# Patient Record
Sex: FEMALE | Race: WHITE | NOT HISPANIC OR LATINO | Employment: OTHER | URBAN - METROPOLITAN AREA
[De-identification: names, ages, dates, MRNs, and addresses within clinical notes are randomized per-mention and may not be internally consistent; named-entity substitution may affect disease eponyms.]

---

## 2017-05-01 ENCOUNTER — OFFICE VISIT (OUTPATIENT)
Dept: NEUROLOGY | Facility: CLINIC | Age: 82
End: 2017-05-01

## 2017-05-01 VITALS — WEIGHT: 179 LBS | BODY MASS INDEX: 31.71 KG/M2 | SYSTOLIC BLOOD PRESSURE: 130 MMHG | DIASTOLIC BLOOD PRESSURE: 70 MMHG

## 2017-05-01 DIAGNOSIS — F03.90 DEMENTIA WITHOUT BEHAVIORAL DISTURBANCE, UNSPECIFIED DEMENTIA TYPE: ICD-10-CM

## 2017-05-01 DIAGNOSIS — R26.89 IMPAIRMENT OF BALANCE: Primary | ICD-10-CM

## 2017-05-01 PROCEDURE — 99214 OFFICE O/P EST MOD 30 MIN: CPT | Performed by: PHYSICIAN ASSISTANT

## 2017-05-02 ENCOUNTER — TELEPHONE (OUTPATIENT)
Dept: NEUROLOGY | Facility: CLINIC | Age: 82
End: 2017-05-02

## 2017-07-17 RX ORDER — DONEPEZIL HYDROCHLORIDE 10 MG/1
TABLET, FILM COATED ORAL
Qty: 30 TABLET | Refills: 11 | Status: SHIPPED | OUTPATIENT
Start: 2017-07-17 | End: 2018-07-15 | Stop reason: SDUPTHER

## 2017-11-06 ENCOUNTER — OFFICE VISIT (OUTPATIENT)
Dept: NEUROLOGY | Facility: CLINIC | Age: 82
End: 2017-11-06

## 2017-11-06 VITALS
BODY MASS INDEX: 31.18 KG/M2 | SYSTOLIC BLOOD PRESSURE: 116 MMHG | DIASTOLIC BLOOD PRESSURE: 78 MMHG | HEIGHT: 63 IN | WEIGHT: 176 LBS

## 2017-11-06 DIAGNOSIS — R41.89 COGNITIVE IMPAIRMENT: Primary | ICD-10-CM

## 2017-11-06 PROCEDURE — 99214 OFFICE O/P EST MOD 30 MIN: CPT | Performed by: PSYCHIATRY & NEUROLOGY

## 2017-11-06 RX ORDER — ESCITALOPRAM OXALATE 20 MG/1
20 TABLET ORAL DAILY
COMMUNITY
End: 2018-07-25

## 2017-11-06 RX ORDER — MECLIZINE HYDROCHLORIDE 25 MG/1
25 TABLET ORAL 3 TIMES DAILY PRN
COMMUNITY
End: 2020-10-07

## 2017-11-06 RX ORDER — PHENOL 1.4 %
600 AEROSOL, SPRAY (ML) MUCOUS MEMBRANE DAILY
COMMUNITY
End: 2018-07-25

## 2017-11-06 RX ORDER — ARIPIPRAZOLE 2 MG/1
2 TABLET ORAL DAILY
COMMUNITY
End: 2018-10-09 | Stop reason: CLARIF

## 2017-11-06 NOTE — PROGRESS NOTES
"Subjective      CC: memory loss    History of Present Illness   Daly Gauthier is a 82 y.o. female who returns to clinic today with a difficult history of cognitive impairment. She and her family have noted a progressive cognitive decline since 2011 when she underwent chemotherapy and radiation for breast cancer. This has been marked over time by impairments in memory, orientation, language and executive function.    An MRI and screening bloodwork have been unrevealing. She has been treated with donepezil, and has been intolerant of Namenda due to dizziness. She saw Dr. Urrutia at  in March, 2017 and was diagnosed with vascular dementia by report.     Since her last visit on 5/1/17, her family notes continued cognitive impairment and apathy. Her family has also noted \"blackout episodes\" during which she can act normally but is subsequently amnestic. She continues to follow with behavioral health for depression.      The following portions of the patient's history were reviewed and updated as appropriate: allergies, current medications, past family history, past medical history, past social history, past surgical history and problem list.    Review of Systems   Constitutional: Negative.        Objective     /78  Ht 63\" (160 cm)  Wt 176 lb (79.8 kg)  BMI 31.18 kg/m2    General appearance today is normal.       Physical Exam   Neurological: She has a normal Finger-Nose-Finger Test.   Psychiatric: Her speech is normal.        Neurologic Exam     Mental Status   Oriented to person.   Disoriented to place.   Disoriented to time.   Registration: recalls 3 of 3 objects. Recall at 5 minutes: recalls 3 of 3 objects. Follows 3 step commands.   Attention: normal.   Speech: speech is normal   Level of consciousness: alert  Able to name object. Able to read. Able to repeat. Able to write. Normal comprehension.     Cranial Nerves   Cranial nerves II through XII intact.     Gait, Coordination, and Reflexes     Gait  Gait: " (somewhat unsteady)    Coordination   Finger to nose coordination: normal    Tremor   Resting tremor: absent        Results  MMSE=23      Assessment/Plan   Daly was seen today for memory loss.    Diagnoses and all orders for this visit:    Cognitive impairment  -     EEG Awake or Asleep Routine          Discussion/Summary   Daly Gauthier returns to clinic today again with a difficult history. A vascular dementia as proposed by Dr. Urrutia at  is possible, I also worry about the contribution of depression, which was discussed. I again reviewed her current status and treatment options. It was elected to again attempt to obtain her records from her recent visit at . After discussing potential treatment options, it was elected to continue on donepezil unchanged. She will continue to follow with behavioral health. I again recommended increased physical activity. We will obtain an EEG to better exclude seizure. For chronic hypoxia her family will check with her PCP. She will then follow up in 6 months, or sooner if needed.       I spent 20 minutes out of 30  minutes face to face with the patient and family and discussing diagnosis, diagnostic testing, evaluation, current status, treatment options and management.    As part of this visit I obtained additional history from the family which is incorporated in the HPI.      Jun Kennedy MD

## 2017-11-15 ENCOUNTER — HOSPITAL ENCOUNTER (OUTPATIENT)
Dept: NEUROLOGY | Facility: HOSPITAL | Age: 82
Discharge: HOME | End: 2017-11-15
Attending: PSYCHIATRY & NEUROLOGY | Admitting: PSYCHIATRY & NEUROLOGY

## 2017-11-15 PROCEDURE — 95816 EEG AWAKE AND DROWSY: CPT

## 2018-05-07 ENCOUNTER — OFFICE VISIT (OUTPATIENT)
Dept: NEUROLOGY | Facility: CLINIC | Age: 83
End: 2018-05-07

## 2018-05-07 VITALS
DIASTOLIC BLOOD PRESSURE: 60 MMHG | SYSTOLIC BLOOD PRESSURE: 124 MMHG | WEIGHT: 170 LBS | HEIGHT: 63 IN | BODY MASS INDEX: 30.12 KG/M2

## 2018-05-07 DIAGNOSIS — R41.89 COGNITIVE IMPAIRMENT: Primary | ICD-10-CM

## 2018-05-07 PROCEDURE — 99214 OFFICE O/P EST MOD 30 MIN: CPT | Performed by: PHYSICIAN ASSISTANT

## 2018-07-06 ENCOUNTER — HOSPITAL ENCOUNTER (OUTPATIENT)
Dept: SPEECH THERAPY | Facility: HOSPITAL | Age: 83
Setting detail: THERAPIES SERIES
Discharge: HOME | End: 2018-07-06

## 2018-07-06 DIAGNOSIS — R41.89 COGNITIVE IMPAIRMENT: ICD-10-CM

## 2018-07-06 DIAGNOSIS — G31.84 MILD COGNITIVE IMPAIRMENT: Primary | ICD-10-CM

## 2018-07-06 PROCEDURE — 92523 SPEECH SOUND LANG COMPREHEN: CPT

## 2018-07-06 PROCEDURE — G9169 MEMORY GOAL STATUS: HCPCS

## 2018-07-06 PROCEDURE — G9168 MEMORY CURRENT STATUS: HCPCS

## 2018-07-06 NOTE — THERAPY EVALUATION
Outpatient Speech Language Pathology   Adult Speech Language Cognitive Initial Evaluation  [facility]     Patient Name: Daly Gauthier  : 1935  MRN: [id number]  Today's Date: 2018        Visit Date: 2018   Patient Active Problem List   Diagnosis   • Mild cognitive impairment   • History of right breast cancer   • Cognitive impairment        Past Medical History:   Diagnosis Date   • Anxiety    • Depression    • Diabetes mellitus (CMS/HCC)    • Hyperlipidemia    • Hypertension         No past surgical history on file.      Visit Dx:    ICD-10-CM ICD-9-CM   1. Mild cognitive impairment G31.84 331.83   2. Cognitive impairment R41.89 294.9                 SLP SLC Evaluation - 18 1500        Communication Assessment/Intervention    Document Type evaluation  -HG    Subjective Information no complaints  -HG    Patient Observations alert;cooperative;agree to therapy  -HG    Patient/Family Observations  present for evaluation and is supportive.  -HG    Patient Effort good  -HG    Symptoms Noted During/After Treatment none  -HG       General Information    Patient Profile Reviewed yes  -HG    Pertinent History Of Current Problem Pt's family has noted a decrease in memory since . In 2017 pt was seen by Neurology and was given a vasular dementia dx.   -HG    Precautions/Limitations, Vision WFL with corrective lenses  -HG    Precautions/Limitations, Hearing WFL  -HG    Prior Level of Function-Communication WFL  -HG    Plans/Goals Discussed with patient;spouse/S.O.  -HG    Barriers to Rehab cognitive status  -HG    Patient's Goals for Discharge patient did not state  -HG    Family Goals for Discharge patient able to provide self-care with only supervision  -HG    Standardized Assessment Used SLUMS  -HG       Cognitive Assessment Intervention- SLP    Cognitive Function (Cognition) severe impairment  -HG    Orientation Status (Cognition) person;place  -HG    Memory (Cognitive) severe  impairment  -HG    Attention (Cognitive) moderate impairment;severe impairment  -HG    Thought Organization (Cognitive) moderate impairment;severe impairment  -HG    Reasoning (Cognitive) moderate impairment;severe impairment  -HG    Problem Solving (Cognitive) moderate impairment;severe impairment  -HG       Standardized Tests    Cognitive/Memory Tests SLUMS: [facility] Mental Status Examination  -HG       SLUMS: [facility] Mental Status Examination    SLUMS Score 13  -HG    SLUMS Range 1-20: Dementia (High school education or higher)  -HG       SLP Clinical Impressions    SLP Diagnosis Moderate to Severe cognitive impairment  -HG    Rehab Potential/Prognosis good  -    Criteria for Skilled Therapy Interventions Met yes  -HG    Functional Impact functional impact in social situations;functional impact in ADLs;unable to make medical decisions;unable to care for self;needs 24 hour supervision;difficulty communicating wants, needs;difficulty communicating in an emergency;difficulty in expressing complex messages;restrictions in personal and social life;decreased ability to respond to situations safely;Poor Judgement  -HG      User Key  (r) = Recorded By, (t) = Taken By, (c) = Cosigned By    Initials Name Provider Type     Diana Fish MS [facility]-SLP Speech and Language Pathologist                               OP SLP Education     Row Name 07/06/18 1500       Education    Barriers to Learning No barriers identified  -    Education Provided Described results of evaluation;Patient expressed understanding of evaluation;Family/caregivers expressed understanding of evaluation;Patient participated in establishing goals and treatment plan;Family/caregivers participated in establishing goals and treatment plan;Patient requires further education on strategies, risks;Patient demonstrated recommended strategies;Family/caregivers demonstrated recommended strategies;Family/caregivers require further  education on strategies, risks  -    Assessed Learning needs;Learning motivation;Learning preferences;Learning readiness  -    Learning Motivation Strong  -    Learning Method Explanation;Demonstration;Teach back;Written materials  -    Teaching Response Verbalized understanding;Demonstrated understanding;Reinforcement needed  -    Education Comments Pt given thought organization homework.   -HG      User Key  (r) = Recorded By, (t) = Taken By, (c) = Cosigned By    Initials Name Effective Dates    HG Diana DUMONT MS [facility]-SLP 06/22/15 -                 SLP OP Goals     Row Name 07/06/18 1500          Goal Type Needed    Goal Type Needed Memory;Other Adult Goals  -        Subjective Comments    Subjective Comments Pt alert, cooperative, accompanied with .  -HG        Memory Goals    Patient will be able to remember  information needed to participate in activities of daily living at least 2-3 a day with min verbal and visual cues.  -HG     Status: Patient will be able to remember  information needed to participate in activities of daily living New  -HG     Patient’s memory skills will be enhanced as reported by patient by using external memory aides 80%:;with cues  -HG     Status: Patient’s memory skills will be enhanced as reported by patient by using external memory aides New  -HG     Patient will demonstrate improved ability to recall information by listening to paragraph and answering yes/no questions 80%:;with cues  -HG     Status: Patient will demonstrate improved ability to recall information by listening to paragraph and answering yes/no questions New  -HG     Patient will demonstrate improved ability to recall information by stating activities patient has completed that day 80%:;with cues  -HG     Status: Patient will demonstrate improved ability to recall information by stating activities patient has completed that day New  -        Other Goals    Other Adult Goal- 1 Pt will complete  divergent and convergent naming with 80% accuracy with min cues.  -HG     Status: Other Adult Goal- 1 New  -HG     Other Adult Goal- 2 Pt will complete reasoning tasks with 80% accuracy.   -HG     Status: Other Adult Goal- 2 New  -HG     Other Adult Goal- 3 Pt will improve SLUMS score to at least a 20 falling in the MNCD range.   -HG        SLP Time Calculation    SLP Goal Re-Cert Due Date 08/05/18  -HG       User Key  (r) = Recorded By, (t) = Taken By, (c) = Cosigned By    Initials Name Provider Type     Diana Fish MS [facility]-SLP Speech and Language Pathologist                OP SLP Assessment/Plan - 07/06/18 1500        SLP Assessment    Functional Problems Speech Language- Adult/Cognition  -    Impact on Function: Adult Speech Language/Cognition Difficulty communicating wants, needs, and ideas;Difficulty communicating in a medical emergency;Restrictions in personal and social life;Difficulty sequencing thoughts to express complex messages;Unable to understand written/spoken language;Difficulty following directions;Difficulty sequencing or problem solving to complete ADLs;Unrealistic view of abilities/deficits;Lack of insight or awareness of deficits, safety issues;Decreased recall of personal information and medical history;Trouble learning or remembering new information;Poor attention to task;Requires supervision  -    Clinical Impression: Speech Language-Adult/Congnition Moderate-Severe:;Cognitive Communication Impairment  -HG    Functional Problems Comment Pt unable to complete any task without her  and has exhibited a physical decline as well with a referral to PT and OT.  -HG    Clinical Impression Comments SLUMS score of 13/30 places pt in the Dementia range.  -HG    Please refer to paper survey for additional self-reported information Yes  -HG    Please refer to items scanned into chart for additional diagnostic informaiton and handouts as provided by clinician Yes  -HG    SLP Diagnosis  Moderate to Severe Cognitive Impairment  -HG    Prognosis Good (comment)  -HG    Patient/caregiver participated in establishment of treatment plan and goals Yes  -HG    Patient would benefit from skilled therapy intervention Yes  -HG       SLP Plan    Frequency 1-2x/week  -HG    Duration 8 weeks  -HG    Planned CPT's? SLP SPEECH & LANGUAGE EVAL: 43649;SLP INDIVIDUAL SPEECH THERAPY: 28188  -HG    Expected Duration Therapy Session - minutes 45-60 minutes  -HG    Plan Comments Initiate Cog tx.   -HG      User Key  (r) = Recorded By, (t) = Taken By, (c) = Cosigned By    Initials Name Provider Type     Diana Fish MS CCC-SLP Speech and Language Pathologist                 Time Calculation:   SLP Start Time: 1500    Therapy Charges for Today     Code Description Service Date Service Provider Modifiers Qty    93261360811 HC ST MEMORY CURRENT 7/6/2018 Diana Fish MS CCC-SLP GN, CK 1    78874610564 HC ST MEMORY PROJECTED 7/6/2018 Diana Fish MS CCC-SLP GN, CJ 1    77677350180 HC ST EVAL SPEECH AND PROD W LANG  6 7/6/2018 Diana Fish MS CCC-SLP GN 1          SLP G-Codes  SLP NOMS Used?: Yes  Functional Limitations: Memory  Memory Current Status (): At least 40 percent but less than 60 percent impaired, limited or restricted  Memory Goal Status (): At least 20 percent but less than 40 percent impaired, limited or restricted        Diana Fish MS CCC-SLP  7/6/2018

## 2018-07-11 DIAGNOSIS — R26.89 IMPAIRMENT OF BALANCE: Primary | ICD-10-CM

## 2018-07-13 ENCOUNTER — HOSPITAL ENCOUNTER (OUTPATIENT)
Dept: SPEECH THERAPY | Facility: HOSPITAL | Age: 83
Setting detail: THERAPIES SERIES
Discharge: HOME | End: 2018-07-13

## 2018-07-13 DIAGNOSIS — R41.89 COGNITIVE IMPAIRMENT: ICD-10-CM

## 2018-07-13 DIAGNOSIS — G31.84 MILD COGNITIVE IMPAIRMENT: Primary | ICD-10-CM

## 2018-07-13 PROCEDURE — 92507 TX SP LANG VOICE COMM INDIV: CPT

## 2018-07-13 NOTE — THERAPY TREATMENT NOTE
Outpatient Speech Language Pathology   Adult Speech Language Cognitive Treatment Note  [facility]     Patient Name: Daly Gauthier  : 1935  MRN: [id number]  Today's Date: 2018         Visit Date: 2018   Patient Active Problem List   Diagnosis   • Mild cognitive impairment   • History of right breast cancer   • Cognitive impairment          Visit Dx:    ICD-10-CM ICD-9-CM   1. Mild cognitive impairment G31.84 331.83   2. Cognitive impairment R41.89 294.9                               SLP OP Goals     Row Name 18 1500          Goal Type Needed    Goal Type Needed Memory;Other Adult Goals  -HG        Subjective Comments    Subjective Comments Pt alert, cooperative, accompanied with ; feeling un-steady on her feet.   -HG        Memory Goals    Patient will be able to remember  information needed to participate in activities of daily living at least 2-3 a day with min verbal and visual cues.  -HG     Status: Patient will be able to remember  information needed to participate in activities of daily living Progressing as expected  -HG     Comments: Patient will be able to remember  information needed to participate in activities of daily living 18: Incorporated the use of a daily journal during session in order to improve recall of immediate and recent information- pt required max cues in order to complete the task.   -HG     Patient’s memory skills will be enhanced as reported by patient by using external memory aides 80%:;with cues  -HG     Status: Patient’s memory skills will be enhanced as reported by patient by using external memory aides Progressing as expected  -HG     Comments: Patient’s memory skills will be enhanced as reported by patient by using external memory aides 18: Started the use of a daily journal this date and provided an outline of what information could be filled in each day.   -HG     Patient will demonstrate improved ability to recall information by  listening to paragraph and answering yes/no questions 80%:;with cues  -HG     Status: Patient will demonstrate improved ability to recall information by listening to paragraph and answering yes/no questions New  -HG     Patient will demonstrate improved ability to recall information by stating activities patient has completed that day 80%:;with cues  -HG     Status: Patient will demonstrate improved ability to recall information by stating activities patient has completed that day Progressing as expected  -HG     Comments: Patient will demonstrate improved ability to recall information by stating activities patient has completed that day 7/13/18: Introduced use of journal during session in order to record all daily activites.   -HG        Other Goals    Other Adult Goal- 1 Pt will complete divergent and convergent naming with 80% accuracy with min cues.  -HG     Status: Other Adult Goal- 1 Progressing as expected  -HG     Comments: Other Adult Goal- 1 7/13/18: Convergent naming: pt was 95% accurate.   -HG     Other Adult Goal- 2 Pt will complete reasoning tasks with 80% accuracy.   -HG     Status: Other Adult Goal- 2 Progressing as expected  -HG     Comments: Other Adult Goal- 2 7/13/18: Play unfamiliar game of [employer] and pt required max cues in order to participate.   -HG     Other Adult Goal- 3 Pt will improve SLUMS score to at least a 20 falling in the MNCD range.   -HG        SLP Time Calculation    SLP Goal Re-Cert Due Date 08/05/18  -HG       User Key  (r) = Recorded By, (t) = Taken By, (c) = Cosigned By    Initials Name Provider Type    FALLON Fish MS [facility]SLP Speech and Language Pathologist                OP SLP Education     Row Name 07/13/18 1500       Education    Education Comments Pt given homework for recall and thought organization as well as use of a journal.   -HG      User Key  (r) = Recorded By, (t) = Taken By, (c) = Cosigned By    Initials Name Effective Dates    FALLON Fish MS  CCC-SLP 06/22/15 -                 OP SLP Assessment/Plan - 07/13/18 1500        SLP Plan    Plan Comments Cont with Cog tx.   -HG      User Key  (r) = Recorded By, (t) = Taken By, (c) = Cosigned By    Initials Name Provider Type    FALLON Fish, MS CCC-SLP Speech and Language Pathologist                 Time Calculation:   SLP Start Time: 1500    Therapy Charges for Today     Code Description Service Date Service Provider Modifiers Qty    [id number]  ST TREATMENT SPEECH 4 7/13/2018 Diana Fish MS CCC-SLP GN 1                   Diana Fish MS CCC-SLP  7/13/2018

## 2018-07-16 RX ORDER — DONEPEZIL HYDROCHLORIDE 10 MG/1
TABLET, FILM COATED ORAL
Qty: 30 TABLET | Refills: 11 | Status: SHIPPED | OUTPATIENT
Start: 2018-07-16 | End: 2019-07-07 | Stop reason: SDUPTHER

## 2018-07-18 ENCOUNTER — HOSPITAL ENCOUNTER (OUTPATIENT)
Dept: OCCUPATIONAL THERAPY | Facility: HOSPITAL | Age: 83
Setting detail: THERAPIES SERIES
Discharge: HOME | End: 2018-07-18

## 2018-07-18 DIAGNOSIS — Z78.9 DECREASED INDEPENDENCE WITH ACTIVITIES OF DAILY LIVING: Primary | ICD-10-CM

## 2018-07-18 DIAGNOSIS — Z74.09 DECREASED FUNCTIONAL MOBILITY AND ENDURANCE: ICD-10-CM

## 2018-07-18 PROCEDURE — G8985 CARRY GOAL STATUS: HCPCS | Performed by: OCCUPATIONAL THERAPIST

## 2018-07-18 PROCEDURE — 97167 OT EVAL HIGH COMPLEX 60 MIN: CPT | Performed by: OCCUPATIONAL THERAPIST

## 2018-07-18 PROCEDURE — G8984 CARRY CURRENT STATUS: HCPCS | Performed by: OCCUPATIONAL THERAPIST

## 2018-07-18 NOTE — THERAPY EVALUATION
Outpatient Occupational Therapy Neuro Initial Evaluation  [facility]     Patient Name: Daly Gauthier  : 1935  MRN: [id number]  Today's Date: 2018      Visit Date: 2018    Patient Active Problem List   Diagnosis   • Mild cognitive impairment   • History of right breast cancer   • Cognitive impairment        Past Medical History:   Diagnosis Date   • Anxiety    • Depression    • Diabetes mellitus (CMS/HCC)    • Hyperlipidemia    • Hypertension         History reviewed. No pertinent surgical history.      Visit Dx:      ICD-10-CM ICD-9-CM   1. Decreased independence with activities of daily living Z65.8 V62.89   2. Decreased functional mobility and endurance Z74.09 780.99             Patient History     Row Name 18 1300             History    Chief Complaint Balance Problems;Difficulty with daily activities;Fatigue/poor endurance;Other 1 (comment);Headache   impaired coordination  -EM      Date Current Problem(s) Began --   gradually progressing for a couple years - per   -EM      Brief Description of Current Complaint Pt and  provided PMH and current medical status. They report the patient has gradually declined over the past 2-3 years. She has demo increased difficulty with functional mobility, memory and overall independence with ADLs. Her memory has also declined and she has had an increase in headaches. This has resulted in overall decreased participation in desired occupations such as, attending social groups ([employer] and cancer support group) and going to water aerobics. She is seeing SLP to address her memory concerns and her  is supportive and encouraging for patient to exercise and do her homework.   -EM      Hand Dominance right-handed  -EM      Occupation/sports/leisure activities water aerobics, spend time with family, shopping, read, used to be active in women's group at [employer],  tries to encourage her to go to cancer support group  -EM      Patient  seeing anyone else for problem(s)? SLP for memory issues  -EM         Pain     Pain Location Head  -EM      Pain at Present 8  -EM      Pain at Best 0  -EM      Pain at Worst 8  -EM      Pain Comments  reports she took ibuprofen but it didn't seem to help.  -EM         Fall Risk Assessment    Any falls in the past year: No  -EM      Number of falls reported in the last 12 months 0  -EM         Daily Activities    Primary Language English  -EM      Are you able to read Yes  -EM      Are you able to write Yes  -EM      Barriers to learning Cognitive  -EM      Pt Participated in POC and Goals Yes  -EM         Safety    Are you being hurt, hit, or frightened by anyone at home or in your life? No  -EM      Are you being neglected by a caregiver No  -EM        User Key  (r) = Recorded By, (t) = Taken By, (c) = Cosigned By    Initials Name Provider Type    EM Mary Jo Hurd OTR Occupational Therapist                OT Neuro     Row Name 07/18/18 1300             Precautions and Contraindications    Precautions/Limitations fall precautions  -EM         Subjective Pain    Able to rate subjective pain? yes  -EM      Pre-Treatment Pain Level 8  -EM      Post-Treatment Pain Level 8  -EM      Subjective Pain Comment headache  -EM         Home Living    Living Arrangements house  -EM      Home Accessibility stairs to enter home;stairs within home  -EM      Number of Stairs, Main Entrance two  -EM      Stair Railings, Main Entrance none  -EM      Stairs, Within Home, Primary 3 down into den  -EM      Home Equipment Cane;Grab bars;Other (Comment);Rollator  -EM      Living Environment Comment lives with , one dog and one cat  -EM         Vision- Basic    Current Vision Wears glasses all the time  -EM         Sensation    Additional Comments reports no numbness/tingling  -EM         Posture/Observations    Alignment Options Forward head;Rounded shoulders  -EM      Posture/Observations Comments Pt demo rigidity with  movements and limited arm swing when walking.   -EM         General ROM    GENERAL ROM COMMENTS WFL B UEs  -EM         MMT (Manual Muscle Testing)    Additional Documentation General Assessment (Manual Muscle Testing) (Group)  -EM         General Assessment (Manual Muscle Testing)    General Manual Muscle Testing (MMT) Assessment upper extremity strength deficits identified  -EM         Upper Extremity (Manual Muscle Testing)    Comment, MMT: Upper Extremity B shoulders 3/5, B elbows and wrists 4/5  -EM         ADL Assessment/Intervention    Comment, IADL Assessment/Training Pt has difficulty with small clothing fasteners and jewelry. Pt reports she picks up things around the house, her  does the heavy cleaning. Pt's  assists with heavy cleaning, cooking and various ADL tasks which pt is unable to do.   -EM      Additional Documentation Comment, IADL Assessment/Training (Row)  -EM        User Key  (r) = Recorded By, (t) = Taken By, (c) = Cosigned By    Initials Name Provider Type    ARIANE Hurd, OTR Occupational Therapist             Hand Therapy (last 24 hours)      Hand Eval     Row Name 07/18/18 1300             Hand  Strength     Strength Affected Side Bilateral  -EM          Strength Right    # Reps 3  -EM      Right Rung 2  -EM      Right  Test 1 26  -EM      Right  Test 2 24  -EM      Right  Test 3 24  -EM       Strength Average Right 24.67  -EM          Strength Left    # Reps 3  -EM      Left Rung 2  -EM      Left  Test 1 32  -EM      Left  Test 2 32  -EM      Left  Test 3 34  -EM       Strength Average Left 32.67  -EM         Pinch Strength    Number of Reps 3  -EM      Affected Side Bilateral  -EM         Right Hand Strength - Pinch (lbs)    Lateral 9.7 lbs  -EM         Left Hand Strength - Pinch (lbs)    Lateral 7 lbs  -EM        User Key  (r) = Recorded By, (t) = Taken By, (c) = Cosigned By    Initials Name Provider Type    ARIANE ALMONTE  DEB Hurd Occupational Therapist              Therapy Education  Education Details: role of OT and POC  Program: New  How Provided: Verbal  Provided to: Patient, Caregiver  Level of Understanding: Verbalized          OT Goals     Row Name 07/18/18 1300          OT Short Term Goals    STG Date to Achieve 08/15/18  -EM     STG 1 Pt and  will be educated in HEP to increase UB and hand strength for increased independence with ADLs  -EM     STG 1 Progress New  -EM     STG 2 Patient will complete dynamic standing balance activities while incorporating B UEs into tasks with min A   -EM     STG 2 Progress New  -EM     STG 3 Patient will complete handwriting and coordination HEP to increase independence with writing cards/letters to family members as well as other FMC throughout daily routine  -EM     STG 3 Progress New  -EM        Long Term Goals    LTG Date to Achieve 09/12/18  -EM     LTG 1 Pt and  will be independent with HEPs using written instructions  -EM     LTG 1 Progress New  -EM     LTG 2 Patient will complete dynamic standing balance activities while incorporating B UEs into tasks with SBA  -EM     LTG 2 Progress New  -EM     LTG 3 Patient will score < 40 secs on 9 HPT bilaterally to indicate increased independence with ADL tasks.   -EM     LTG 3 Progress New  -EM        Time Calculation    OT Goal Re-Cert Due Date 10/16/18  -EM       User Key  (r) = Recorded By, (t) = Taken By, (c) = Cosigned By    Initials Name Provider Type    EM DEB Newby Occupational Therapist                OT Assessment/Plan     Row Name 07/18/18 1300          OT Assessment    Functional Limitations Decreased safety during functional activities;Limitation in home management;Limitations in community activities;Performance in self-care ADL;Performance in leisure activities;Limitations in functional capacity and performance  -EM     Impairments Balance;Coordination;Dexterity;Endurance;Motor function;Posture;Pain;Muscle  strength;Cognition   ADL independence  -EM     Assessment Comments Extensive PMH gathered. Pt demo decreased independence, safety and satisfaction with ADL tasks and engaging in desired occupations d/t above listed impairments. Her history of depression and cognitive issues interfere with overall motivaiton and participation in ADLs, causing above impairments to worsen.   -EM     Please refer to paper survey for additional self-reported information Yes  -EM     OT Rehab Potential Good  -EM     Patient/caregiver participated in establishment of treatment plan and goals Yes  -EM     Patient would benefit from skilled therapy intervention Yes  -EM        OT Plan    OT Frequency 1x/week  -EM     Predicted Duration of Therapy Intervention (Therapy Eval) 12 visits  -EM     Planned CPT's? OT EVAL HIGH COMPLEXITY: 77485;OT SELF CARE/MGMT/TRAIN 15 MIN: 12132;OT NEUROMUSC RE EDUCATION EA 15 MIN: 81361;OT THER PROC EA 15 MIN: 00552XY;OT THER ACT EA 15 MIN: 00607XL  -EM     Planned Therapy Interventions (Optional Details) balance training;home exercise program;manual therapy techniques;motor coordination training;neuromuscular re-education;transfer training;stretching;strengthening;ROM (Range of Motion);postural re-education;patient/family education;other (see comments)   ADL retrianing - AE/DME  -EM     OT Plan Comments Recommend skilled OT services to address established goals as specified  -EM       User Key  (r) = Recorded By, (t) = Taken By, (c) = Cosigned By    Initials Name Provider Type    EM Mary Jo Hurd, OTR Occupational Therapist                OT Exercises     Row Name 07/18/18 1300             Total Minutes    28136 - OT Therapeutic Exercise Minutes 10  -EM         Exercise 1    Exercise Name 1 energy and strength for ADL tasks  -EM      Time (Minutes) 1 8  mins on nu-step at level 5 with spm avg of 29   -EM        User Key  (r) = Recorded By, (t) = Taken By, (c) = Cosigned By    Initials Name Provider Type    EM  DEB Newby Occupational Therapist              Outcome Measure Options: 9 Hole Peg, Other Outcome Measure, AM-PAC 6 Clicks Daily Activity (OT)  9 Hole Peg  9-Hole Peg Left: 49.62  9-Hole Peg Right: 43.09  How much help from another is currently needed...  Putting on and taking off regular lower body clothing?: a little  Bathing (including washing, rinsing, and drying): a little  Toileting (which includes using toilet bed pan or urinal): a little  Putting on and taking off regular upper body clothing: a lot  Taking care of personal grooming (such as brushing teeth): a little  Eating meals: a little  Score: 17         Time Calculation:   OT Start Time: 1300     Therapy Suggested Charges     Code   Minutes Charges    37645 (CPT®) Hc Ot Neuromusc Re Education Ea 15 Min      88483 (CPT®) Hc Ot Ther Proc Ea 15 Min 10 1    80733 (CPT®) Hc Ot Therapeutic Act Ea 15 Min      76978 (CPT®) Hc Ot Manual Therapy Ea 15 Min      04691 (CPT®) Hc Ot Iontophoresis Ea 15 Min      93531 (CPT®) Hc Ot Elec Stim Ea-Per 15 Min      40890 (CPT®) Hc Ot Ultrasound Ea 15 Min      66500 (CPT®) Hc Ot Self Care/Mgmt/Train Ea 15 Min      Total  10 1          Therapy Charges for Today     Code Description Service Date Service Provider Modifiers Qty    25973300377 HC OT CARRY MOV HAND OBJ CURRENT 7/18/2018 DEB Newby CK 1    17436139019 HC OT CARRY MOV HAND OBJ PROJECTED 7/18/2018 DEB Newby CJ 1    40228699657 HC OT EVAL HIGH COMPLEXITY 4 7/18/2018 DEB Newby 1          OT G-codes  OT Professional Judgement Used?: Yes  OT Functional Scales Options: AM-PAC 6 Clicks Daily Activity (OT), 9 Hole Peg  Functional Limitation: Carrying, moving and handling objects  Carrying, Moving and Handling Objects Current Status (): At least 40 percent but less than 60 percent impaired, limited or restricted  Carrying, Moving and Handling Objects Goal Status (): At least 20 percent but less than 40 percent impaired,  limited or restricted          Mary Jo Hurd, OTR  7/18/2018

## 2018-07-23 ENCOUNTER — TELEPHONE (OUTPATIENT)
Dept: NEUROLOGY | Facility: CLINIC | Age: 83
End: 2018-07-23

## 2018-07-23 NOTE — TELEPHONE ENCOUNTER
----- Message from Shante Lopez PA-C sent at 7/20/2018  8:59 AM EDT -----  Maritza,     Would you care to contact Ms. Mares's family to see if they would like to come in earlier than their next scheduled appointment (possibly a 3:30 slot on Dr. Kennedy's schedule). I have been talking with her occupational therapist who mentioned she was having more troublesome symptoms. Thanks

## 2018-07-25 ENCOUNTER — OFFICE VISIT (OUTPATIENT)
Dept: NEUROLOGY | Facility: CLINIC | Age: 83
End: 2018-07-25

## 2018-07-25 VITALS
WEIGHT: 170 LBS | DIASTOLIC BLOOD PRESSURE: 88 MMHG | BODY MASS INDEX: 30.12 KG/M2 | HEIGHT: 63 IN | SYSTOLIC BLOOD PRESSURE: 122 MMHG

## 2018-07-25 DIAGNOSIS — R41.89 COGNITIVE IMPAIRMENT: Primary | ICD-10-CM

## 2018-07-25 PROCEDURE — 99214 OFFICE O/P EST MOD 30 MIN: CPT | Performed by: PSYCHIATRY & NEUROLOGY

## 2018-07-25 RX ORDER — PSEUDOEPHEDRINE HCL 30 MG
30 TABLET ORAL DAILY
COMMUNITY
End: 2019-02-22

## 2018-07-25 NOTE — PROGRESS NOTES
"Subjective     Chief Complaint: memory loss     History of Present Illness   Daly Gauthier is a 82 y.o. female who returns to clinic today with a difficult history of cognitive impairment. She and her family have noted a progressive cognitive decline since 2011 when she underwent chemotherapy and radiation for breast cancer. This has been marked over time by impairments in memory, orientation, language and executive function. There have also been associated symptoms of depression and apathy.      An MRI and screening bloodwork have been unrevealing. She has been treated with donepezil, and has been intolerant of Namenda due to dizziness. She saw Dr. Urrutia at  in March, 2017 and was diagnosed with vascular dementia by report.     Since her last visit on 5/7/18, she and her family note ongoing cognitive impairment and apathy. She does better cognitively if she is more active, though her family has difficulty getting her to be more active. Her  feels that she is becoming weaker due to inactivity. She endorses ongoing, and possibly worsening depression.      I have reviewed and confirmed the past family, social and medical history as accurate on 7/25/18.     Review of Systems    Objective     /88   Ht 160 cm (62.99\")   Wt 77.1 kg (170 lb)   BMI 30.12 kg/m²     General appearance today is normal.     Physical Exam   Neurological: She has normal strength. She has a normal Finger-Nose-Finger Test.   Psychiatric: Her speech is normal.        Neurologic Exam     Mental Status   Oriented to person.   Disoriented to place.   Disoriented to time.   Registration: recalls 3 of 3 objects. Recall at 5 minutes: recalls 3 of 3 objects. Follows 3 step commands.   Attention: normal.   Speech: speech is normal   Level of consciousness: alert  Able to name object. Able to read. Able to repeat. Able to write. Normal comprehension.     Cranial Nerves   Cranial nerves II through XII intact.     Motor Exam   Muscle bulk: " normal  Overall muscle tone: normal    Strength   Strength 5/5 throughout.     Sensory Exam   Light touch normal.     Gait, Coordination, and Reflexes     Coordination   Finger to nose coordination: normalGait, mildly decreased arm swing and turns en bloc         Results  MMSE= 22    Assessment/Plan   Daly was seen today for memory loss.    Diagnoses and all orders for this visit:    Cognitive impairment          Discussion/Summary   Daly Gauthier returns to clinic today with a history of cognitive impairment. While a vascular dementia as proposed by Dr. Urrutia at  is possible, I continue to be concerned about the contribution of depression, which I again discussed today. I think it reasonable to continue on donepezil for now. She may have some degree of parkinsonism, which could be related to vascular disease and/or Abilify, though I'm not convinced this is a major contributor to her current status, which I discussed. I therefore did not make further recommendations at this time beyond continued follow-up with behavioral health. She and her family will consider PT referral, though, in the near future.  She will then follow up as previously scheduled (11/7/18).      I spent 15 minutes out of 25 minutes face to face with the patient and family and discussing diagnosis, evaluation, current status, treatment options and management.    As part of this visit I obtained additional history from the family which is incorporated in the HPI.      Jun Kennedy MD

## 2018-07-27 ENCOUNTER — TELEPHONE (OUTPATIENT)
Dept: NEUROLOGY | Facility: CLINIC | Age: 83
End: 2018-07-27

## 2018-07-27 ENCOUNTER — HOSPITAL ENCOUNTER (OUTPATIENT)
Dept: OCCUPATIONAL THERAPY | Facility: HOSPITAL | Age: 83
Setting detail: THERAPIES SERIES
Discharge: HOME | End: 2018-07-27

## 2018-07-27 DIAGNOSIS — Z78.9 DECREASED INDEPENDENCE WITH ACTIVITIES OF DAILY LIVING: Primary | ICD-10-CM

## 2018-07-27 DIAGNOSIS — Z74.09 DECREASED FUNCTIONAL MOBILITY AND ENDURANCE: ICD-10-CM

## 2018-07-27 PROCEDURE — 97110 THERAPEUTIC EXERCISES: CPT | Performed by: OCCUPATIONAL THERAPIST

## 2018-07-27 PROCEDURE — 97530 THERAPEUTIC ACTIVITIES: CPT | Performed by: OCCUPATIONAL THERAPIST

## 2018-07-27 NOTE — THERAPY TREATMENT NOTE
"Outpatient Occupational Therapy Neuro Treatment Note  [facility]     Patient Name: Daly Gauthier  : 1935  MRN: [id number]  Today's Date: 2018       Visit Date: 2018    Patient Active Problem List   Diagnosis   • Mild cognitive impairment   • History of right breast cancer   • Cognitive impairment        Past Medical History:   Diagnosis Date   • Anxiety    • Depression    • Diabetes mellitus (CMS/HCC)    • Hyperlipidemia    • Hypertension         No past surgical history on file.      Visit Dx:    ICD-10-CM ICD-9-CM   1. Decreased independence with activities of daily living Z65.8 V62.89   2. Decreased functional mobility and endurance Z74.09 780.99                         OT Assessment/Plan     Row Name 18 1300          OT Assessment    Assessment Comments Pt demo good participation throughout therapy session with VC to follow directions. Pt required VC to understand new exercises.  offers good support. Pt demo overall impaired functional mobility and posture with forward flexion at hips and small stepping patterns.   -EM        OT Plan    OT Plan Comments Continue per POC  -EM       User Key  (r) = Recorded By, (t) = Taken By, (c) = Cosigned By    Initials Name Provider Type    EM Mary Jo Hurd OTR Occupational Therapist              Therapy Education  Given: HEP  Program: New  How Provided: Verbal, Demonstration, Written  Provided to: Patient, Caregiver  Level of Understanding: Verbalized, Demonstrated              OT Exercises     Row Name 18 1100             Subjective Comments    Subjective Comments Pt reports she's feeling \"blah\" today but no major concerns.   -EM         Subjective Pain    Able to rate subjective pain? yes  -EM      Pre-Treatment Pain Level 4  -EM      Post-Treatment Pain Level 3  -EM         Total Minutes    96753 - OT Therapeutic Exercise Minutes 30  -EM      27740 - OT Therapeutic Activity Minutes 25  -EM         Exercise 1    Exercise Name 1 " energy and strength for ADL tasks  -EM      Time (Minutes) 1 11 mins on nu-step at level 4 with spm avg of 35   -EM         Exercise 2    Exercise Name 2 Educated and completed theraband HEP using yellow theraband with physical, written and verbal cues. See HEP for details.   -EM         Exercise 3    Exercise Name 3 Pt completed handwriting activities focusing on coordination, legibility and following directions. Pt demo poor legibility and difficulty following directions.   -EM        User Key  (r) = Recorded By, (t) = Taken By, (c) = Cosigned By    Initials Name Provider Type    EM Mary Jo Hurd, OTR Occupational Therapist                        Time Calculation:   OT Start Time: 1100     Therapy Suggested Charges     Code   Minutes Charges    78771 (CPT®) Hc Ot Neuromusc Re Education Ea 15 Min      42660 (CPT®) Hc Ot Ther Proc Ea 15 Min 30 2    54647 (CPT®) Hc Ot Therapeutic Act Ea 15 Min 25 2    14873 (CPT®) Hc Ot Manual Therapy Ea 15 Min      94287 (CPT®) Hc Ot Iontophoresis Ea 15 Min      68310 (CPT®) Hc Ot Elec Stim Ea-Per 15 Min      61196 (CPT®) Hc Ot Ultrasound Ea 15 Min      56134 (CPT®) Hc Ot Self Care/Mgmt/Train Ea 15 Min      Total  55 4          Therapy Charges for Today     Code Description Service Date Service Provider Modifiers Qty    94514277168 HC OT THER PROC EA 15 MIN 7/27/2018 DEB Newby GO 2    68625736234 HC OT THERAPEUTIC ACT EA 15 MIN 7/27/2018 DEB Newby GO 2                    DEB Delgadillo  7/27/2018

## 2018-07-27 NOTE — TELEPHONE ENCOUNTER
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 But, I felt the biggest issue was depression, and the family is planning to move up her Behavioral Health appointment. Unfortunately, the family and I both feel that there may not be much to do. Her depression is severe and has been refractory. Thanks.    ----- Message -----  From: JEFF Taylor  Sent: 7/26/2018   9:21 AM  To: Jun Kennedy MD  Subject: concerns of PT/OT/SLP                            You saw pt yesterday and therapists wanted to provide you with following information: Everyone assumed she was PD by presentation on her initial evals, mainly by her gait, handwriting is small. They are concerned about her depression which is now causing her to withdraw from activities which she recently participated (ie water aerobics).  Is doing most for her due to cognitive reasons. I will try and touch base with  and wife about approaches. Do you have any other thoughts with this information?

## 2018-08-01 ENCOUNTER — HOSPITAL ENCOUNTER (OUTPATIENT)
Dept: OCCUPATIONAL THERAPY | Facility: HOSPITAL | Age: 83
Setting detail: THERAPIES SERIES
Discharge: HOME | End: 2018-08-01

## 2018-08-01 DIAGNOSIS — Z74.09 DECREASED FUNCTIONAL MOBILITY AND ENDURANCE: ICD-10-CM

## 2018-08-01 DIAGNOSIS — Z78.9 DECREASED INDEPENDENCE WITH ACTIVITIES OF DAILY LIVING: Primary | ICD-10-CM

## 2018-08-01 PROCEDURE — 97110 THERAPEUTIC EXERCISES: CPT | Performed by: OCCUPATIONAL THERAPIST

## 2018-08-01 PROCEDURE — 97530 THERAPEUTIC ACTIVITIES: CPT | Performed by: OCCUPATIONAL THERAPIST

## 2018-08-01 NOTE — THERAPY TREATMENT NOTE
Outpatient Occupational Therapy Neuro Treatment Note  [facility]     Patient Name: Daly Gauthier  : 1935  MRN: [id number]  Today's Date: 2018       Visit Date: 2018    Patient Active Problem List   Diagnosis   • Mild cognitive impairment   • History of right breast cancer   • Cognitive impairment        Past Medical History:   Diagnosis Date   • Anxiety    • Depression    • Diabetes mellitus (CMS/HCC)    • Hyperlipidemia    • Hypertension         No past surgical history on file.      Visit Dx:    ICD-10-CM ICD-9-CM   1. Decreased independence with activities of daily living Z65.8 V62.89   2. Decreased functional mobility and endurance Z74.09 780.99                         OT Assessment/Plan     Row Name 18 1300          OT Assessment    Assessment Comments Pt demo good participation this date with c/o dizziness from early today that hasn't gone away. She requires VC throughout and physical demo 50% of times.Continues to demo weakness and impaired balance and GMC/FMC.   -EM        OT Plan    OT Plan Comments Continue per POC  -EM       User Key  (r) = Recorded By, (t) = Taken By, (c) = Cosigned By    Initials Name Provider Type    EM Mary Jo Hurd OTR Occupational Therapist              Therapy Education  Given: Posture/body mechanics  Program: New  How Provided: Verbal, Demonstration  Provided to: Patient  Level of Understanding: Verbalized, Demonstrated              OT Exercises     Row Name 18 1300             Subjective Comments    Subjective Comments Pt and  report they have been doing her therband HEP regularly with  demoing exercises  -EM         Subjective Pain    Able to rate subjective pain? yes  -EM      Pre-Treatment Pain Level 3  -EM      Post-Treatment Pain Level 3  -EM         Total Minutes    55428 - OT Therapeutic Exercise Minutes 25  -EM      53873 - OT Therapeutic Activity Minutes 30  -EM         Exercise 1    Exercise Name 1 energy and strength for  ADL tasks  -EM      Time (Minutes) 1 10 mins on nu-step at level 5 with spm avg of 35   -EM         Exercise 2    Exercise Name 2 Pt completed B UE strengthening for bicep curls while sitting EOM  -EM      Equipment 2 Dumbell  -EM      Weights/Plates 2 3  -EM      Sets 2 3  -EM      Reps 2 10  -EM         Exercise 3    Exercise Name 3 Large amplitude GMC exercises completed focusing on trunk rotation, UE reaching and trunk flexion.   -EM      Sets 3 1  -EM      Reps 3 10  -EM         Exercise 4    Exercise Name 4 Hand and digit strengthening completed focusing on tip pinch, lateral pinch, manipulation and translation with increased time to complete bilaterally.   -EM        User Key  (r) = Recorded By, (t) = Taken By, (c) = Cosigned By    Initials Name Provider Type    DEB Alvares Occupational Therapist                        Time Calculation:   OT Start Time: 1300     Therapy Suggested Charges     Code   Minutes Charges    14689 (CPT®) Hc Ot Neuromusc Re Education Ea 15 Min      82627 (CPT®) Hc Ot Ther Proc Ea 15 Min 25 2    45077 (CPT®) Hc Ot Therapeutic Act Ea 15 Min 30 2    08425 (CPT®) Hc Ot Manual Therapy Ea 15 Min      43638 (CPT®) Hc Ot Iontophoresis Ea 15 Min      73661 (CPT®) Hc Ot Elec Stim Ea-Per 15 Min      31947 (CPT®) Hc Ot Ultrasound Ea 15 Min      23917 (CPT®) Hc Ot Self Care/Mgmt/Train Ea 15 Min      Total  55 4          Therapy Charges for Today     Code Description Service Date Service Provider Modifiers Qty    29110196932 HC OT THER PROC EA 15 MIN 8/1/2018 DEB Newby GO 2    01936934751 HC OT THERAPEUTIC ACT EA 15 MIN 8/1/2018 DEB Newby GO 2                    DEB Degladillo  8/1/2018

## 2018-08-06 ENCOUNTER — HOSPITAL ENCOUNTER (OUTPATIENT)
Dept: SPEECH THERAPY | Facility: HOSPITAL | Age: 83
Setting detail: THERAPIES SERIES
Discharge: HOME | End: 2018-08-06

## 2018-08-06 DIAGNOSIS — G31.84 MILD COGNITIVE IMPAIRMENT: Primary | ICD-10-CM

## 2018-08-06 DIAGNOSIS — R41.89 COGNITIVE IMPAIRMENT: ICD-10-CM

## 2018-08-06 PROCEDURE — 92507 TX SP LANG VOICE COMM INDIV: CPT

## 2018-08-06 PROCEDURE — G9169 MEMORY GOAL STATUS: HCPCS

## 2018-08-06 PROCEDURE — G9168 MEMORY CURRENT STATUS: HCPCS

## 2018-08-14 ENCOUNTER — HOSPITAL ENCOUNTER (OUTPATIENT)
Dept: OCCUPATIONAL THERAPY | Facility: HOSPITAL | Age: 83
Setting detail: THERAPIES SERIES
Discharge: HOME | End: 2018-08-14

## 2018-08-14 DIAGNOSIS — Z78.9 DECREASED INDEPENDENCE WITH ACTIVITIES OF DAILY LIVING: Primary | ICD-10-CM

## 2018-08-14 DIAGNOSIS — Z74.09 DECREASED FUNCTIONAL MOBILITY AND ENDURANCE: ICD-10-CM

## 2018-08-14 PROCEDURE — 97110 THERAPEUTIC EXERCISES: CPT | Performed by: OCCUPATIONAL THERAPIST

## 2018-08-14 PROCEDURE — G8985 CARRY GOAL STATUS: HCPCS | Performed by: OCCUPATIONAL THERAPIST

## 2018-08-14 PROCEDURE — 97530 THERAPEUTIC ACTIVITIES: CPT | Performed by: OCCUPATIONAL THERAPIST

## 2018-08-14 PROCEDURE — G8984 CARRY CURRENT STATUS: HCPCS | Performed by: OCCUPATIONAL THERAPIST

## 2018-08-14 NOTE — THERAPY TREATMENT NOTE
Outpatient Occupational Therapy Neuro Progress Note  [facility]     Patient Name: Daly Gauthier  : 1935  MRN: [id number]  Today's Date: 2018       Visit Date: 2018    Patient Active Problem List   Diagnosis   • Mild cognitive impairment   • History of right breast cancer   • Cognitive impairment        Past Medical History:   Diagnosis Date   • Anxiety    • Depression    • Diabetes mellitus (CMS/HCC)    • Hyperlipidemia    • Hypertension         No past surgical history on file.      Visit Dx:    ICD-10-CM ICD-9-CM   1. Decreased independence with activities of daily living Z65.8 V62.89   2. Decreased functional mobility and endurance Z74.09 780.99             OT Neuro     Row Name 18 1400             Subjective Comments    Subjective Comments Pt reports she went camping this past weekend and had a great time  -EM         Precautions and Contraindications    Precautions/Limitations fall precautions  -EM         Subjective Pain    Able to rate subjective pain? yes  -EM      Pre-Treatment Pain Level 8  -EM      Subjective Pain Comment headache  -EM         Sensation    Additional Comments no changes since initial eval  -EM         Posture/Observations    Alignment Options Forward head;Rounded shoulders  -EM      Posture/Observations Comments Pt demo rigidity with movements and limited arm swing when walking.   -EM         Coordination    9-Hole Peg Left 37.72  -EM      9-Hole Peg Right 31.18  -EM         General ROM    GENERAL ROM COMMENTS WFL BUEs  -EM         Upper Extremity (Manual Muscle Testing)    Comment, MMT: Upper Extremity B shoulders 4-/5, B elbows and wrists 4/5  -EM         ADL Assessment/Intervention    Comment, IADL Assessment/Training Pt reports some things are getting easier and she does what she can do but her  assists with a lot; wally FMC tasks. Pt does more house cleaning and her  enjoys cooking.   -EM        User Key  (r) = Recorded By, (t) = Taken By, (c)  = Cosigned By    Initials Name Provider Type    LIGIA MandujanodaneLigiaMary Jo SUZY OTR Occupational Therapist             Hand Therapy (last 24 hours)      Hand Eval     Row Name 08/14/18 1400              Strength Right    # Reps 3  -EM      Right Rung 2  -EM      Right  Test 1 30  -EM      Right  Test 2 28  -EM      Right  Test 3 33  -EM       Strength Average Right 30.33  -EM          Strength Left    # Reps 3  -EM      Left Rung 2  -EM      Left  Test 1 34  -EM      Left  Test 2 33  -EM      Left  Test 3 32  -EM       Strength Average Left 33  -EM         Right Hand Strength - Pinch (lbs)    Lateral 9 lbs  -EM         Left Hand Strength - Pinch (lbs)    Lateral 7 lbs  -EM        User Key  (r) = Recorded By, (t) = Taken By, (c) = Cosigned By    Initials Name Provider Type    Mary Jo Bashir OTR Occupational Therapist                    OT Assessment/Plan     Row Name 08/14/18 1400          OT Assessment    Functional Limitations Decreased safety during functional activities;Limitation in home management;Limitations in community activities;Performance in self-care ADL;Performance in leisure activities;Limitations in functional capacity and performance  -EM     Impairments Balance;Coordination;Dexterity;Endurance;Motor function;Posture;Pain;Muscle strength;Cognition  -EM     Assessment Comments Pt demo significantly increased coordination bilaterally and increased  strength. She demo limited changes in pinch strength; however increase in B UEs strength. She continues to require VC to increase amplitude of movements and for safety during funcitonal mobility. She requires encouragment to come to therapy per ; however she participates well and reports she feels better after coming to therapy.   -EM        OT Plan    OT Frequency 1x/week  -EM     Predicted Duration of Therapy Intervention (Therapy Eval) 8 visits  -EM     OT Plan Comments Continue per initial POC to further progress  toward goals as specified.   -EM       User Key  (r) = Recorded By, (t) = Taken By, (c) = Cosigned By    Initials Name Provider Type    ARIANE MandujanodaneMary Jo, OTR Occupational Therapist                OT Goals     Row Name 08/14/18 1400          OT Short Term Goals    STG Date to Achieve 09/11/18  -EM     STG 1 Pt and  will be educated in HEP to increase UB and hand strength for increased independence with ADLs  -EM     STG 1 Progress Met  -EM     STG 2 Patient will complete dynamic standing balance activities while incorporating B UEs into tasks with min A   -EM     STG 2 Progress Met  -EM     STG 3 Patient will complete handwriting and coordination HEP to increase independence with writing cards/letters to family members as well as other FMC throughout daily routine  -EM     STG 3 Progress Ongoing  -EM        Long Term Goals    LTG Date to Achieve 09/12/18  -EM     LTG 1 Pt and  will be independent with HEPs using written instructions  -EM     LTG 1 Progress Ongoing  -EM     LTG 1 Progress Comments Pt completes with 's assist and written program; will continue to educate as appropriate  -EM     LTG 2 Patient will complete dynamic standing balance activities while incorporating B UEs into tasks with SBA  -EM     LTG 2 Progress Ongoing  -EM     LTG 3 Patient will score < 40 secs on 9 HPT bilaterally to indicate increased independence with ADL tasks.   -EM     LTG 3 Progress Met  -EM       User Key  (r) = Recorded By, (t) = Taken By, (c) = Cosigned By    Initials Name Provider Type    Mary Jo Bashir SUZY, OTR Occupational Therapist          Therapy Education  Given: HEP  Program: New  How Provided: Verbal, Demonstration, Written  Provided to: Patient  Level of Understanding: Verbalized, Demonstrated              OT Exercises     Row Name 08/14/18 1400             Total Minutes    03048 - OT Therapeutic Exercise Minutes 25  -EM      39962 - OT Therapeutic Activity Minutes 30  -EM         Exercise 1     Exercise Name 1 energy and strength for ADL tasks  -EM      Time (Minutes) 1 10 mins on nu-step at level 5 with spm avg of 35   -EM         Exercise 2    Exercise Name 2 Pt completed handwriting activities and educated in handwriting HEP    -EM         Exercise 3    Exercise Name 3 Reassessment completed.   -EM        User Key  (r) = Recorded By, (t) = Taken By, (c) = Cosigned By    Initials Name Provider Type    Mary Jo Bashir, OTR Occupational Therapist              9 Hole Peg  9-Hole Peg Left: 37.72  9-Hole Peg Right: 31.18  How much help from another is currently needed...  Putting on and taking off regular lower body clothing?: none  Bathing (including washing, rinsing, and drying): a little  Toileting (which includes using toilet bed pan or urinal): none  Putting on and taking off regular upper body clothing: a little  Taking care of personal grooming (such as brushing teeth): none  Eating meals: none  Score: 22         Time Calculation:   OT Start Time: 1400     Therapy Suggested Charges     Code   Minutes Charges    05958 (CPT®) Hc Ot Neuromusc Re Education Ea 15 Min      38282 (CPT®) Hc Ot Ther Proc Ea 15 Min 25 2    88592 (CPT®) Hc Ot Therapeutic Act Ea 15 Min 30 2    53331 (CPT®) Hc Ot Manual Therapy Ea 15 Min      66947 (CPT®) Hc Ot Iontophoresis Ea 15 Min      55306 (CPT®) Hc Ot Elec Stim Ea-Per 15 Min      17407 (CPT®) Hc Ot Ultrasound Ea 15 Min      39361 (CPT®) Hc Ot Self Care/Mgmt/Train Ea 15 Min      Total  55 4          Therapy Charges for Today     Code Description Service Date Service Provider Modifiers Qty    78912073831 HC OT CARRY MOV HAND OBJ CURRENT 8/14/2018 Mary Jo Hurd OTR GO CK 1    63099267550 HC OT CARRY MOV HAND OBJ PROJECTED 8/14/2018 Mary Jo Hurd OTR GO CJ 1    23916509309 HC OT THER PROC EA 15 MIN 8/14/2018 Mary Jo Hurd OTR GO 2    33711172137 HC OT THERAPEUTIC ACT EA 15 MIN 8/14/2018 Mary Jo Hurd OTFADI GO 2          OT G-codes  OT Professional Judgement Used?: Yes  OT  Functional Scales Options: AM-PAC 6 Clicks Daily Activity (OT), 9 Hole Peg  Functional Limitation: Carrying, moving and handling objects  Carrying, Moving and Handling Objects Current Status (): At least 40 percent but less than 60 percent impaired, limited or restricted  Carrying, Moving and Handling Objects Goal Status (): At least 20 percent but less than 40 percent impaired, limited or restricted         Mary Jo Hurd, OTR  8/14/2018

## 2018-08-20 ENCOUNTER — HOSPITAL ENCOUNTER (OUTPATIENT)
Dept: SPEECH THERAPY | Facility: HOSPITAL | Age: 83
Setting detail: THERAPIES SERIES
Discharge: HOME | End: 2018-08-20

## 2018-08-20 DIAGNOSIS — G31.84 MILD COGNITIVE IMPAIRMENT: Primary | ICD-10-CM

## 2018-08-20 DIAGNOSIS — R41.89 COGNITIVE IMPAIRMENT: ICD-10-CM

## 2018-08-20 PROCEDURE — 92507 TX SP LANG VOICE COMM INDIV: CPT

## 2018-08-20 NOTE — THERAPY TREATMENT NOTE
Outpatient Speech Language Pathology   Adult Speech Language Cognitive Treatment Note  [facility]     Patient Name: Daly Gauthier  : 1935  MRN: [id number]  Today's Date: 2018         Visit Date: 2018   Patient Active Problem List   Diagnosis   • Mild cognitive impairment   • History of right breast cancer   • Cognitive impairment          Visit Dx:    ICD-10-CM ICD-9-CM   1. Mild cognitive impairment G31.84 331.83   2. Cognitive impairment R41.89 294.9                               SLP OP Goals     Row Name 18 1000          Goal Type Needed    Goal Type Needed Memory;Other Adult Goals  -HG        Subjective Comments    Subjective Comments Pt's  reports that the pychiatrist dropped the Abilify.   -HG        Memory Goals    Patient will be able to remember  information needed to participate in activities of daily living at least 2-3 a day with min verbal and visual cues.  -HG     Status: Patient will be able to remember  information needed to participate in activities of daily living Progressing as expected  -HG     Comments: Patient will be able to remember  information needed to participate in activities of daily living 18: Pt was cued to continue to write in her journal. 18: Pt partially completed on her own and SLP guided the entry for this date in order to provide a model for upcoming days. 18: Incorporated the use of a daily journal during session in order to improve recall of immediate and recent information- pt required max cues in order to complete the task.   -HG     Patient’s memory skills will be enhanced as reported by patient by using external memory aides 80%:;with cues  -HG     Status: Patient’s memory skills will be enhanced as reported by patient by using external memory aides Progressing as expected  -HG     Comments: Patient’s memory skills will be enhanced as reported by patient by using external memory aides 18: Initiated journal writing  during session and pt required mod cues. 8/13/18: Journal writing: an entry is made for every day but not detailed in nature. 8/6/18: Pt states she doesn't enjoy writing in it but she does it anyway. She feels that her life is boring. 7/13/18: Started the use of a daily journal this date and provided an outline of what information could be filled in each day.   -HG     Patient will demonstrate improved ability to recall information by listening to paragraph and answering yes/no questions 80%:;with cues  -HG     Status: Patient will demonstrate improved ability to recall information by listening to paragraph and answering yes/no questions New;Progressing as expected  -HG     Comments: Patient will demonstrate improved ability to recall information by listening to paragraph and answering yes/no questions 8/6/18: Name and face association: pt could not recall indepdendenty but with cues, given choices,pt was 60% accurate. 7/27/18: 22-36 word paragraphs: pt was 60% accurate.   -HG     Patient will demonstrate improved ability to recall information by stating activities patient has completed that day 80%:;with cues  -HG     Status: Patient will demonstrate improved ability to recall information by stating activities patient has completed that day Progressing as expected  -HG     Comments: Patient will demonstrate improved ability to recall information by stating activities patient has completed that day 8/20/18: Cont'd work on the Memory Book with assist from her . 8/13/18: Started a memory book and pt was 70% with cues from her . 8/6/18: Pt is writing daily with prompts from her . 7/13/18: Introduced use of journal during session in order to record all daily activites.   -HG        Other Goals    Other Adult Goal- 1 Pt will complete divergent and convergent naming with 80% accuracy with min cues.  -HG     Status: Other Adult Goal- 1 Progressing as expected  -HG     Comments: Other Adult Goal- 1  8/20/18: Sequencing 5 steps: pt was 70% accurate. 8/13/18: Convergent naming: pt was ? 8/6/18: Divergent naming: pt was 80% accurate.  7/27/18: Spelling words that are cut in half; matching first half to last half with a 50% accurayc with mod cues.  Thought organization homework: pt was 75% accurate with cues needed at home.  7/13/18: Convergent naming: pt was 95% accurate.   -HG     Other Adult Goal- 2 Pt will complete reasoning tasks with 80% accuracy.   -HG     Status: Other Adult Goal- 2 Progressing as expected  -HG     Comments: Other Adult Goal- 2 8/20/18: Crossword puzzle: pt was 90% accurate. 8/13/18: fill in the letters: pt was 50% accurate with min cues. 8/6/18: 4 step sequencing: pt was 100% accurate; written directions: pt was 25% accurate. 7/27/18: For 4 step sequencing, pt was 80% accurate with mod cues. 7/13/18: Play unfamiliar game of DeepField 8 and pt required max cues in order to participate.   -HG     Other Adult Goal- 3 Pt will improve SLUMS score to at least a 20 falling in the MNCD range.   -HG        SLP Time Calculation    SLP Goal Re-Cert Due Date 09/05/18  -HG       User Key  (r) = Recorded By, (t) = Taken By, (c) = Cosigned By    Initials Name Provider Type    FALLON ReyesisDinaa MS CCC-SLP Speech and Language Pathologist                OP SLP Education     Row Name 08/20/18 1000       Education    Education Comments Pt given homework for crossword puzzle and journal writing.   -HG      User Key  (r) = Recorded By, (t) = Taken By, (c) = Cosigned By    Initials Name Effective Dates    Diana Xie MS CCC-SLP 06/22/15 -                 OP SLP Assessment/Plan - 08/20/18 1000        SLP Plan    Plan Comments Cont with Cg tx.   -HG      User Key  (r) = Recorded By, (t) = Taken By, (c) = Cosigned By    Initials Name Provider Type    FALLON ReyesisDiana MS CCC-SLP Speech and Language Pathologist                 Time Calculation:   SLP Start Time: 1000    Therapy Charges for Today      Code Description Service Date Service Provider Modifiers Qty    71568307027  ST TREATMENT SPEECH 4 8/20/2018 Diana Fish, MS CCC-SLP GN 1                   Diana Fish MS ESPERANZA-SLP  8/20/2018

## 2018-08-22 ENCOUNTER — HOSPITAL ENCOUNTER (OUTPATIENT)
Dept: OCCUPATIONAL THERAPY | Facility: HOSPITAL | Age: 83
Setting detail: THERAPIES SERIES
Discharge: HOME | End: 2018-08-22

## 2018-08-22 DIAGNOSIS — Z74.09 DECREASED FUNCTIONAL MOBILITY AND ENDURANCE: ICD-10-CM

## 2018-08-22 DIAGNOSIS — Z78.9 DECREASED INDEPENDENCE WITH ACTIVITIES OF DAILY LIVING: Primary | ICD-10-CM

## 2018-08-22 PROCEDURE — 97110 THERAPEUTIC EXERCISES: CPT | Performed by: OCCUPATIONAL THERAPIST

## 2018-08-22 PROCEDURE — 97530 THERAPEUTIC ACTIVITIES: CPT | Performed by: OCCUPATIONAL THERAPIST

## 2018-08-22 NOTE — THERAPY TREATMENT NOTE
"Outpatient Occupational Therapy Neuro Treatment Note  [facility]     Patient Name: Daly Gauthier  : 1935  MRN: [id number]  Today's Date: 2018       Visit Date: 2018    Patient Active Problem List   Diagnosis   • Mild cognitive impairment   • History of right breast cancer   • Cognitive impairment        Past Medical History:   Diagnosis Date   • Anxiety    • Depression    • Diabetes mellitus (CMS/HCC)    • Hyperlipidemia    • Hypertension         No past surgical history on file.      Visit Dx:    ICD-10-CM ICD-9-CM   1. Decreased independence with activities of daily living Z65.8 V62.89   2. Decreased functional mobility and endurance Z74.09 780.99             OT Neuro     Row Name 18 1300             Subjective Comments    Subjective Comments Pt reports she takes it \"day by day\" and she doesn't feel well when she first wakes up  -EM         Precautions and Contraindications    Precautions/Limitations fall precautions  -EM         Subjective Pain    Able to rate subjective pain? yes  -EM      Pre-Treatment Pain Level 0  -EM      Post-Treatment Pain Level 0  -EM      Subjective Pain Comment Pt reports dizziness but no pain today  -EM        User Key  (r) = Recorded By, (t) = Taken By, (c) = Cosigned By    Initials Name Provider Type    Mary Jo Bashir, OTR Occupational Therapist                        OT Assessment/Plan     Row Name 18 1300          OT Assessment    Assessment Comments Pt demo increased UB strength as evident by good quality movements with B UEs using strong resistant band. Good participation throughout session with verbal and tactile cues at times to understand new exercises, but good memory of therapist name and recalls HEPs.  -EM        OT Plan    OT Plan Comments Continue per POC  -EM       User Key  (r) = Recorded By, (t) = Taken By, (c) = Cosigned By    Initials Name Provider Type    Mary Jo Bashir, OTR Occupational Therapist              Therapy " Education  Given: HEP  Program: New (and reinforced)  How Provided: Verbal, Demonstration, Written  Provided to: Patient, Caregiver  Level of Understanding: Verbalized, Demonstrated              OT Exercises     Row Name 08/22/18 1300             Total Minutes    38254 - OT Therapeutic Exercise Minutes 30  -EM      75159 - OT Therapeutic Activity Minutes 30  -EM         Exercise 1    Exercise Name 1 energy and strength for ADL tasks  -EM      Equipment 1 UE Ergometer  -EM      Time (Minutes) 1 5 mins each way at level 5 with VC to keep RPMs > 25  -EM         Exercise 2    Exercise Name 2 Educated and completed dynamic standing exercises to increase standing balance in small spaces (such as the shower) See HEP for details.   -EM         Exercise 3    Exercise Name 3 Reviewed and progress theraband HEP - see HEP for details - used red band   -EM         Exercise 4    Exercise Name 4 wrist ext/flex and supination/pronation - mod VC with physical and tactile demo to understand new exercises  -EM      Equipment 4 Dumbell  -EM      Weights/Plates 4 3  -EM      Sets 4 3  -EM      Reps 4 10  -EM         Exercise 5    Exercise Name 5 FMC and hand/digit strengthening using firm resistant putty and small pegs with max VC to understand activity. Focusing on tip pinch, lateral pinch, translation and in-hand manipulation.   -EM        User Key  (r) = Recorded By, (t) = Taken By, (c) = Cosigned By    Initials Name Provider Type    Mary Jo Bashir, OTR Occupational Therapist                        Time Calculation:   OT Start Time: 1300     Therapy Suggested Charges     Code   Minutes Charges    22950 (CPT®) Hc Ot Neuromusc Re Education Ea 15 Min      90389 (CPT®) Hc Ot Ther Proc Ea 15 Min 30 2    99292 (CPT®) Hc Ot Therapeutic Act Ea 15 Min 30 2    87484 (CPT®) Hc Ot Manual Therapy Ea 15 Min      72122 (CPT®) Hc Ot Iontophoresis Ea 15 Min      50114 (CPT®) Hc Ot Elec Stim Ea-Per 15 Min      77239 (CPT®) Hc Ot Ultrasound Ea 15 Min       86955 (CPT®) Hc Ot Self Care/Mgmt/Train Ea 15 Min      Total  60 4          Therapy Charges for Today     Code Description Service Date Service Provider Modifiers Qty    06978412788  OT THER PROC EA 15 MIN 8/22/2018 Mary Jo Hurd OTR GO 2    [id number]  OT THERAPEUTIC ACT EA 15 MIN 8/22/2018 Mary Jo Hurd OTR GO 2                    DEB Delgadillo  8/22/2018

## 2018-08-27 ENCOUNTER — HOSPITAL ENCOUNTER (OUTPATIENT)
Dept: SPEECH THERAPY | Facility: HOSPITAL | Age: 83
Setting detail: THERAPIES SERIES
Discharge: HOME | End: 2018-08-27

## 2018-08-27 DIAGNOSIS — R41.89 COGNITIVE IMPAIRMENT: Primary | ICD-10-CM

## 2018-08-27 DIAGNOSIS — G31.84 MILD COGNITIVE IMPAIRMENT: ICD-10-CM

## 2018-08-27 PROCEDURE — 92507 TX SP LANG VOICE COMM INDIV: CPT

## 2018-08-27 NOTE — THERAPY TREATMENT NOTE
Outpatient Speech Language Pathology   Adult Speech Language Cognitive Treatment Note  [facility]     Patient Name: Daly Gauthier  : 1935  MRN: [id number]  Today's Date: 2018         Visit Date: 2018   Patient Active Problem List   Diagnosis   • Mild cognitive impairment   • History of right breast cancer   • Cognitive impairment          Visit Dx:    ICD-10-CM ICD-9-CM   1. Cognitive impairment R41.89 294.9   2. Mild cognitive impairment G31.84 331.83                               SLP OP Goals     Row Name 18 1000          Goal Type Needed    Goal Type Needed Memory;Other Adult Goals  -HG        Subjective Comments    Subjective Comments Pt alert, cooperative, accompanied with - returned with journal.   -HG        Memory Goals    Patient will be able to remember  information needed to participate in activities of daily living at least 2-3 a day with min verbal and visual cues.  -HG     Status: Patient will be able to remember  information needed to participate in activities of daily living Progressing as expected  -HG     Comments: Patient will be able to remember  information needed to participate in activities of daily living 18; Pt continues to be prompted by  daily and instructed by SLP the purpose of journaling. 18: Pt was cued to continue to write in her journal. 18: Pt partially completed on her own and SLP guided the entry for this date in order to provide a model for upcoming days. 18: Incorporated the use of a daily journal during session in order to improve recall of immediate and recent information- pt required max cues in order to complete the task.   -HG     Patient’s memory skills will be enhanced as reported by patient by using external memory aides 80%:;with cues  -HG     Status: Patient’s memory skills will be enhanced as reported by patient by using external memory aides Progressing as expected  -HG     Comments: Patient’s memory  skills will be enhanced as reported by patient by using external memory aides 8/27/18: Pt had an entry for every day since last session.  8/20/18: Initiated journal writing during session and pt required mod cues. 8/13/18: Journal writing: an entry is made for every day but not detailed in nature. 8/6/18: Pt states she doesn't enjoy writing in it but she does it anyway. She feels that her life is boring. 7/13/18: Started the use of a daily journal this date and provided an outline of what information could be filled in each day.   -HG     Patient will demonstrate improved ability to recall information by listening to paragraph and answering yes/no questions 80%:;with cues  -HG     Status: Patient will demonstrate improved ability to recall information by listening to paragraph and answering yes/no questions New;Progressing as expected  -HG     Comments: Patient will demonstrate improved ability to recall information by listening to paragraph and answering yes/no questions 8/6/18: Name and face association: pt could not recall indepdendenty but with cues, given choices,pt was 60% accurate. 7/27/18: 22-36 word paragraphs: pt was 60% accurate.   -HG     Patient will demonstrate improved ability to recall information by stating activities patient has completed that day 80%:;with cues  -HG     Status: Patient will demonstrate improved ability to recall information by stating activities patient has completed that day Progressing as expected  -HG     Comments: Patient will demonstrate improved ability to recall information by stating activities patient has completed that day 8/27/18: Minimal progress on memoy book due to slow processing. 8/20/18: Cont'd work on the Memory Book with assist from her . 8/13/18: Started a memory book and pt was 70% with cues from her . 8/6/18: Pt is writing daily with prompts from her . 7/13/18: Introduced use of journal during session in order to record all daily  activites.   -HG        Other Goals    Other Adult Goal- 1 Pt will complete divergent and convergent naming with 80% accuracy with min cues.  -HG     Status: Other Adult Goal- 1 Progressing as expected  -HG     Comments: Other Adult Goal- 1 8/20/18: Sequencing 5 steps: pt was 70% accurate. 8/13/18: Convergent naming: pt was ? 8/6/18: Divergent naming: pt was 80% accurate.  7/27/18: Spelling words that are cut in half; matching first half to last half with a 50% accurayc with mod cues.  Thought organization homework: pt was 75% accurate with cues needed at home.  7/13/18: Convergent naming: pt was 95% accurate.   -HG     Other Adult Goal- 2 Pt will complete reasoning tasks with 80% accuracy.   -HG     Status: Other Adult Goal- 2 Progressing as expected  -HG     Comments: Other Adult Goal- 2 8/27/18: Crossword puzzle: pt was 100% accurate. 8/20/18: Crossword puzzle: pt was 90% accurate. 8/13/18: fill in the letters: pt was 50% accurate with min cues. 8/6/18: 4 step sequencing: pt was 100% accurate; written directions: pt was 25% accurate. 7/27/18: For 4 step sequencing, pt was 80% accurate with mod cues. 7/13/18: Play unfamiliar game of [employer] 8 and pt required max cues in order to participate.   -HG     Other Adult Goal- 3 Pt will improve SLUMS score to at least a 20 falling in the MNCD range.   -        SLP Time Calculation    SLP Goal Re-Cert Due Date 09/05/18  -       User Key  (r) = Recorded By, (t) = Taken By, (c) = Cosigned By    Initials Name Provider Type    Diana Xie MS CCC-SLP Speech and Language Pathologist                OP SLP Education     Row Name 08/27/18 1000       Education    Education Comments Pt given thought organization homework as well as cont'd journaling for recall purposes.   -      User Key  (r) = Recorded By, (t) = Taken By, (c) = Cosigned By    Initials Name Effective Dates    Diana Xie MS CCC-SLP 06/22/15 -                 OP SLP Assessment/Plan - 08/27/18  1000        SLP Plan    Plan Comments Cont with Cog tx.   -HG      User Key  (r) = Recorded By, (t) = Taken By, (c) = Cosigned By    Initials Name Provider Type     Diana Fish MS CCC-SLP Speech and Language Pathologist                 Time Calculation:   SLP Start Time: 1000    Therapy Charges for Today     Code Description Service Date Service Provider Modifiers Qty    27056465712  ST TREATMENT SPEECH 4 8/27/2018 Diana Fish MS CCC-SLP GN 1                   Diana Fish MS CCC-SLP  8/27/2018

## 2018-08-29 ENCOUNTER — HOSPITAL ENCOUNTER (OUTPATIENT)
Dept: OCCUPATIONAL THERAPY | Facility: HOSPITAL | Age: 83
Setting detail: THERAPIES SERIES
Discharge: HOME | End: 2018-08-29

## 2018-08-29 DIAGNOSIS — Z74.09 DECREASED FUNCTIONAL MOBILITY AND ENDURANCE: Primary | ICD-10-CM

## 2018-08-29 DIAGNOSIS — Z78.9 DECREASED INDEPENDENCE WITH ACTIVITIES OF DAILY LIVING: ICD-10-CM

## 2018-08-29 PROCEDURE — 97530 THERAPEUTIC ACTIVITIES: CPT | Performed by: OCCUPATIONAL THERAPIST

## 2018-08-29 PROCEDURE — 97110 THERAPEUTIC EXERCISES: CPT | Performed by: OCCUPATIONAL THERAPIST

## 2018-08-29 NOTE — THERAPY TREATMENT NOTE
Outpatient Occupational Therapy Neuro Treatment Note  [facility]     Patient Name: Daly Gauthier  : 1935  MRN: [id number]  Today's Date: 2018       Visit Date: 2018    Patient Active Problem List   Diagnosis   • Mild cognitive impairment   • History of right breast cancer   • Cognitive impairment        Past Medical History:   Diagnosis Date   • Anxiety    • Depression    • Diabetes mellitus (CMS/HCC)    • Hyperlipidemia    • Hypertension         No past surgical history on file.      Visit Dx:    ICD-10-CM ICD-9-CM   1. Decreased functional mobility and endurance Z74.09 780.99   2. Decreased independence with activities of daily living Z65.8 V62.89                         OT Assessment/Plan     Row Name 18 1300          OT Assessment    Assessment Comments Pt demo difficulty w/ physically demanding exercises this date w/ c/o dizziness and fatigue. She required increased time for all cognitive and coordination tasks w/ VC throughout.   -EM        OT Plan    OT Plan Comments continue per POC  -EM       User Key  (r) = Recorded By, (t) = Taken By, (c) = Cosigned By    Initials Name Provider Type    Mary Jo Bashir, OTR Occupational Therapist              Therapy Education  Given: HEP  Program: Reinforced  How Provided: Verbal  Provided to: Patient, Caregiver  Level of Understanding: Verbalized              OT Exercises     Row Name 18 1300             Subjective Comments    Subjective Comments Pt reports she really isn't feel well this morning. Increased dizziness noted.   -EM         Subjective Pain    Able to rate subjective pain? yes  -EM      Pre-Treatment Pain Level 0  -EM      Post-Treatment Pain Level 0  -EM      Subjective Pain Comment no pain but dizziness reported  -EM         Total Minutes    45316 - OT Therapeutic Exercise Minutes 15  -EM      19644 - OT Therapeutic Activity Minutes 30  -EM         Exercise 1    Exercise Name 1 energy and strength for ADL tasks  -EM       Time (Minutes) 1 10 mins on nu-step at level 6 with avg RPMs at 20.   -EM         Exercise 2    Exercise Name 2 FMC and cognitive activities completed focusing on visual scanning, matching, following 2-step directions, tip pinch, lateral pinch and bilateral coordination. Increased time and mod VC.   -EM        User Key  (r) = Recorded By, (t) = Taken By, (c) = Cosigned By    Initials Name Provider Type    EM Mary Jo Hurd, OTR Occupational Therapist                        Time Calculation:   OT Start Time: 1300     Therapy Suggested Charges     Code   Minutes Charges    73529 (CPT®) Hc Ot Neuromusc Re Education Ea 15 Min      19758 (CPT®) Hc Ot Ther Proc Ea 15 Min 15 1    95945 (CPT®) Hc Ot Therapeutic Act Ea 15 Min 30 2    68493 (CPT®) Hc Ot Manual Therapy Ea 15 Min      19673 (CPT®) Hc Ot Iontophoresis Ea 15 Min      09263 (CPT®) Hc Ot Elec Stim Ea-Per 15 Min      39448 (CPT®) Hc Ot Ultrasound Ea 15 Min      23816 (CPT®) Hc Ot Self Care/Mgmt/Train Ea 15 Min      Total  45 3          Therapy Charges for Today     Code Description Service Date Service Provider Modifiers Qty    95197513305 HC OT THER PROC EA 15 MIN 8/29/2018 Mary Jo Hurd OTR GO 1    17666515719 HC OT THERAPEUTIC ACT EA 15 MIN 8/29/2018 Mary Jo Hurd OTR GO 2                    DEB Delgadillo  8/29/2018

## 2018-09-05 ENCOUNTER — HOSPITAL ENCOUNTER (OUTPATIENT)
Dept: OCCUPATIONAL THERAPY | Facility: HOSPITAL | Age: 83
Setting detail: THERAPIES SERIES
Discharge: HOME | End: 2018-09-05

## 2018-09-05 DIAGNOSIS — Z74.09 DECREASED FUNCTIONAL MOBILITY AND ENDURANCE: Primary | ICD-10-CM

## 2018-09-05 DIAGNOSIS — Z78.9 DECREASED INDEPENDENCE WITH ACTIVITIES OF DAILY LIVING: ICD-10-CM

## 2018-09-05 PROCEDURE — 97530 THERAPEUTIC ACTIVITIES: CPT | Performed by: OCCUPATIONAL THERAPIST

## 2018-09-05 PROCEDURE — 97110 THERAPEUTIC EXERCISES: CPT | Performed by: OCCUPATIONAL THERAPIST

## 2018-09-05 NOTE — THERAPY TREATMENT NOTE
"Outpatient Occupational Therapy Neuro Treatment Note  [facility]     Patient Name: Daly Gauthier  : 1935  MRN: [id number]  Today's Date: 2018       Visit Date: 2018    Patient Active Problem List   Diagnosis   • Mild cognitive impairment   • History of right breast cancer   • Cognitive impairment        Past Medical History:   Diagnosis Date   • Anxiety    • Depression    • Diabetes mellitus (CMS/HCC)    • Hyperlipidemia    • Hypertension         No past surgical history on file.      Visit Dx:    ICD-10-CM ICD-9-CM   1. Decreased functional mobility and endurance Z74.09 780.99   2. Decreased independence with activities of daily living Z65.8 V62.89                         OT Assessment/Plan     Row Name 18 1100          OT Assessment    Assessment Comments Pt requires verbal encouragement to participate and physical demo with VC to understanding exercises.  She continues to c/o dizziness and \"not feeling well\"; however demo good participation throughout session.   -EM        OT Plan    OT Plan Comments continue per POC  -EM       User Key  (r) = Recorded By, (t) = Taken By, (c) = Cosigned By    Initials Name Provider Type    Mary Jo Bashir, OTR Occupational Therapist              Therapy Education  Given: HEP, Posture/body mechanics  Program: New, Reinforced  How Provided: Verbal, Demonstration  Provided to: Patient  Level of Understanding: Verbalized, Demonstrated              OT Exercises     Row Name 18 1100             Subjective Comments    Subjective Comments Pt reports she is \"dizzy, dizzy, dizzy\" today  -EM         Subjective Pain    Able to rate subjective pain? yes  -EM      Pre-Treatment Pain Level 0  -EM      Post-Treatment Pain Level 0  -EM         Total Minutes    66932 - OT Therapeutic Exercise Minutes 30  -EM      59029 - OT Therapeutic Activity Minutes 30  -EM         Exercise 1    Exercise Name 1 energy and strength for ADL tasks  -EM      Time (Minutes) 1 10 " mins on nu-step at level 6 with avg RPMs at 20.   -EM         Exercise 2    Exercise Name 2 Pt completes chair exercise routine including lateral raises, front raises, bicep curls, wrist flex/ext/supination/prontation, marching and alternate kicking. Pt also completed breathing and stretching to include trunk lateral lean and rotation. all UB and LB exercises completed 3x10 and breathing and stretching 3x15 sec holds.   -EM         Exercise 3    Exercise Name 3 Pt completed handwriting activities focusing on increasing FMC and legibility for functional writing.   -EM        User Key  (r) = Recorded By, (t) = Taken By, (c) = Cosigned By    Initials Name Provider Type    Mary Jo Bashir OTFADI Occupational Therapist                        Time Calculation:   OT Start Time: 1100     Therapy Suggested Charges     Code   Minutes Charges    12401 (CPT®) Hc Ot Neuromusc Re Education Ea 15 Min      03919 (CPT®) Hc Ot Ther Proc Ea 15 Min 30 2    05445 (CPT®) Hc Ot Therapeutic Act Ea 15 Min 30 2    03115 (CPT®) Hc Ot Manual Therapy Ea 15 Min      54174 (CPT®) Hc Ot Iontophoresis Ea 15 Min      69142 (CPT®) Hc Ot Elec Stim Ea-Per 15 Min      70311 (CPT®) Hc Ot Ultrasound Ea 15 Min      64753 (CPT®) Hc Ot Self Care/Mgmt/Train Ea 15 Min      Total  60 4          Therapy Charges for Today     Code Description Service Date Service Provider Modifiers Qty    75063597368 HC OT THER PROC EA 15 MIN 9/5/2018 Mary Jo Hurd OTR GO 2    52165980278 HC OT THERAPEUTIC ACT EA 15 MIN 9/5/2018 Mary Jo Hurd OTR GO 2                    DEB Delgadillo  9/5/2018

## 2018-09-10 ENCOUNTER — HOSPITAL ENCOUNTER (OUTPATIENT)
Dept: PHYSICAL THERAPY | Facility: HOSPITAL | Age: 83
Setting detail: THERAPIES SERIES
Discharge: HOME | End: 2018-09-10

## 2018-09-10 DIAGNOSIS — R26.89 BALANCE PROBLEM: ICD-10-CM

## 2018-09-10 DIAGNOSIS — R26.9 GAIT ABNORMALITY: Primary | ICD-10-CM

## 2018-09-10 PROCEDURE — 97110 THERAPEUTIC EXERCISES: CPT | Performed by: PHYSICAL THERAPIST

## 2018-09-10 PROCEDURE — 97162 PT EVAL MOD COMPLEX 30 MIN: CPT | Performed by: PHYSICAL THERAPIST

## 2018-09-10 PROCEDURE — G8979 MOBILITY GOAL STATUS: HCPCS | Performed by: PHYSICAL THERAPIST

## 2018-09-10 PROCEDURE — G8978 MOBILITY CURRENT STATUS: HCPCS | Performed by: PHYSICAL THERAPIST

## 2018-09-10 NOTE — THERAPY EVALUATION
.Outpatient Physical Therapy Neuro Initial Evaluation  [facility]     Patient Name: Daly Gauthier  : 1935  MRN: [id number]  Today's Date: 9/10/2018      Visit Date: 09/10/2018    Patient Active Problem List   Diagnosis   • Mild cognitive impairment   • History of right breast cancer   • Cognitive impairment        Past Medical History:   Diagnosis Date   • Anxiety    • Depression    • Diabetes mellitus (CMS/HCC)    • Hyperlipidemia    • Hypertension         History reviewed. No pertinent surgical history.      Visit Dx:     ICD-10-CM ICD-9-CM   1. Gait abnormality R26.9 781.2   2. Balance problem R26.89 781.99             Patient History     Row Name 09/10/18 1100             History    Chief Complaint Balance Problems;Difficulty with daily activities;Fatigue/poor endurance;Other 1 (comment);Headache   impaired coordination  -MW      Date Current Problem(s) Began --   gradually progressing for a couple years - per   -MW      Brief Description of Current Complaint Pt and  provided PMH and current medical status. They report the patient has gradually declined over the past 2-3 years. She has demo increased difficulty with functional mobility, memory and overall independence with ADLs. Her memory has also declined and she has had an increase in headaches. This has resulted in overall decreased participation in desired occupations such as, attending social groups ([employer] and cancer support group) and going to water aerobics. She is seeing SLP to address her memory concerns and her  is supportive and encouraging for patient to exercise and do her homework and OT services for ADL's and B UE strength/fine motor.  -MW      Hand Dominance right-handed  -MW      Occupation/sports/leisure activities water aerobics, spend time with family, shopping, read, used to be active in women's group at [employer],  tries to encourage her to go to cancer support group  -MW      Patient seeing anyone  else for problem(s)? SLP for memory issues  -MW         Pain     Pain Location Head  -MW      Pain at Present 5  -MW      Pain at Best 0  -MW      Pain at Worst 8  -MW         Fall Risk Assessment    Any falls in the past year: No  -MW      Number of falls reported in the last 12 months 0  -MW         Daily Activities    Primary Language English  -MW      Are you able to read Yes  -MW      Are you able to write Yes  -MW      Barriers to learning Cognitive  -MW      Pt Participated in POC and Goals Yes  -MW         Safety    Are you being hurt, hit, or frightened by anyone at home or in your life? No  -MW      Are you being neglected by a caregiver No  -MW        User Key  (r) = Recorded By, (t) = Taken By, (c) = Cosigned By    Initials Name Provider Type    MW Deysi Hatch, PT Physical Therapist                    PT Neuro     Row Name 09/10/18 1100             Precautions and Contraindications    Precautions/Limitations fall precautions  -MW         Home Living    Living Arrangements house  -MW      Home Accessibility stairs to enter home  -MW      Number of Stairs, Main Entrance two  -MW      Stair Railings, Main Entrance none  -MW      Stairs, Within Home, Primary 3 down into the den  -MW      Home Equipment Cane;Grab bars;Other (Comment);Rollator  -MW      Living Environment Comment lives with , one dog and one cat  -MW         Cognition    Overall Cognitive Status Impaired  -MW      Comments see speech therapy  -MW         Sensation    Sensation WNL? WNL  -MW      Light Touch No apparent deficits  -MW         Proprioception    Proprioception WNL  -MW         Posture/Observations    Posture/Observations Comments Pt. unable to stand fully upright with B hip and knee flexion   -MW         Coordination    Coordination Tests Rapid Alternating  -MW      Rapid Alternating Bilteral:;Impaired   decreased riley  -MW         General ROM    GENERAL ROM COMMENTS B hamstring tightness with PROM  -MW         MMT  (Manual Muscle Testing)    Rt Lower Ext Rt Hip Flexion;Rt Hip Extension;Rt Hip ABduction;Rt Hip ADduction;Rt Knee Extension;Rt Knee Flexion;Rt Ankle Plantarflexion;Rt Ankle Dorsiflexion  -MW      Lt Lower Ext Lt Hip Flexion;Lt Hip Extension;Lt Hip ABduction;Lt Hip ADduction;Lt Knee Extension;Lt Knee Flexion;Lt Ankle Plantarflexion;Lt Ankle Dorsiflexion  -MW         MMT Right Lower Ext    Rt Hip Flexion MMT, Gross Movement (4-/5) good minus  -MW      Rt Hip Extension MMT, Gross Movement (4-/5) good minus  -MW      Rt Hip ABduction MMT, Gross Movement (4-/5) good minus  -MW      Rt Hip ADduction MMT, Gross Movement (4-/5) good minus  -MW      Rt Knee Extension MMT, Gross Movement (4-/5) good minus  -MW      Rt Knee Flexion MMT, Gross Movement (4-/5) good minus  -MW      Rt Ankle Plantarflexion MMT, Gross Movement (4-/5) good minus  -MW      Rt Ankle Dorsiflexion MMT, Gross Movement (4-/5) good minus  -MW         MMT Left Lower Ext    Lt Hip Flexion MMT, Gross Movement (4-/5) good minus  -MW      Lt Hip Extension MMT, Gross Movement (4-/5) good minus  -MW      Lt Hip ABduction MMT, Gross Movement (4-/5) good minus  -MW      Lt Hip ADduction MMT, Gross Movement (4-/5) good minus  -MW      Lt Knee Extension MMT, Gross Movement (4-/5) good minus  -MW      Lt Knee Flexion MMT, Gross Movement (4-/5) good minus  -MW      Lt Ankle Plantarflexion MMT, Gross Movement (4-/5) good minus  -MW      Lt Ankle Dorsiflexion MMT, Gross Movement (4-/5) good minus  -MW         Bed Mobility Assessment/Treatment    Comment (Bed Mobility) extra time to perform  -MW         Transfers    Bed-Chair [place] (Transfers) supervision  -MW      Chair-Bed [place] (Transfers) supervision  -MW      Sit-Stand [place] (Transfers) supervision  -MW      Stand-Sit [place] (Transfers) supervision  -MW      Transfer, Safety Issues balance decreased during turns;sequencing ability decreased  -MW      Transfer, Impairments decreased  flexibility;strength decreased;impaired balance;coordination impaired  -MW         Gait/Stairs Assessment/Training    [place] Level (Gait) supervision  -MW      Deviations/Abnormal Patterns (Gait) riley decreased;gait speed decreased;base of support, narrow  -MW      Bilateral Gait Deviations forward flexed posture   B hip and knee flexion  -MW      Number of Steps (Stairs) 12  -MW      Ascending Technique (Stairs) step-over-step  -MW      Descending Technique (Stairs) step-over-step  -MW      Stairs, Safety Issues balance decreased during turns;sequencing ability decreased  -MW      Stairs, Impairments decreased flexibility;strength decreased;impaired balance;coordination impaired  -MW        User Key  (r) = Recorded By, (t) = Taken By, (c) = Cosigned By    Initials Name Provider Type    Deysi Isaac, PT Physical Therapist                  Therapy Education  Given: HEP  Program: New  How Provided: Verbal, Demonstration, Written  Provided to: Patient, Caregiver  Level of Understanding: Verbalized, Teach back education performed          PT OP Goals     Row Name 09/10/18 1100          PT Short Term Goals    STG Date to Achieve 10/15/18  -MW     STG 1 Patient to improve GAMBLE balance score to >/= 40/56 to decrease client's risk of falls.  -MW     STG 1 Progress New  -MW     STG 2 Patient to perform TUG within 13 sec without LOB for improved functional mobility.  -MW     STG 2 Progress New  -MW     STG 3 Patient to improve FGA score to >/= 15/30 to decrease client's risk of falls.  -MW     STG 3 Progress New  -MW     STG 4 Patient to ambulate 10 meters without AD within 12 sec without LOB for improved gait riley and functional mobility.  -MW     STG 4 Progress New  -MW        Long Term Goals    LTG Date to Achieve 11/19/18  -MW     LTG 1 Patient to improve GAMBLE balance score to >/= 48/56 to decrease client's risk of falls.  -MW     LTG 1 Progress New  -MW     LTG 2 Patient to perform TUG within 11 sec  without LOB for improved functional mobility.  -MW     LTG 2 Progress New  -MW     LTG 3 Patient to improve FGA score to >/= 22/30 to decrease client's risk of falls.  -MW     LTG 3 Progress New  -MW     LTG 4 Patient to ambulate 10 meters without AD within 10 sec without LOB for improved gait riley and functional mobility.  -MW     LTG 4 Progress New  -MW     LTG 5 Pt. to perform 8 sit to stands within 30 seconds without UE A for improved functional mobility.    -MW     LTG 5 Progress New  -MW     LTG 6 Patient to be I with HEP.  -MW     LTG 6 Progress New  -MW        Time Calculation    PT Goal Re-Cert Due Date 12/09/18  -MW       User Key  (r) = Recorded By, (t) = Taken By, (c) = Cosigned By    Initials Name Provider Type    Deysi Isaac, PT Physical Therapist                PT Assessment/Plan     Row Name 09/10/18 1100          PT Assessment    Functional Limitations Impaired gait;Impaired locomotion;Limitations in community activities;Performance in self-care ADL  -     Impairments Balance;Cognition;Coordination;Endurance;Gait;Impaired flexibility;Locomotion;Muscle strength;Posture  -     Assessment Comments Pt. has evolving impairements that include decreased balance, strength, transfers, gait, stair climbing and increased risk of falls.  Pt. to benefit from PT servcies to improve listed impairments.  -MW     Please refer to paper survey for additional self-reported information Yes  -MW     Rehab Potential Good  -MW     Patient/caregiver participated in establishment of treatment plan and goals Yes  -MW     Patient would benefit from skilled therapy intervention Yes  -MW        PT Plan    PT Frequency 1x/week  -MW     Predicted Duration of Therapy Intervention (Therapy Eval) 10 visits  -MW     Planned CPT's? PT EVAL MOD COMPLELITY: 52744;PT THER PROC EA 15 MIN: 86838;PT THER ACT EA 15 MIN: 69297;PT NEUROMUSC RE-EDUCATION EA 15 MIN: 57622;PT GAIT TRAINING EA 15 MIN: 01713  -MW     PT Plan Comments  PT services to improve gait, balance, strength, and overall functional mobility.  -MW       User Key  (r) = Recorded By, (t) = Taken By, (c) = Cosigned By    Initials Name Provider Type    Deysi Isaac PT Physical Therapist                   Exercises     Row Name 09/10/18 1100             Total Minutes    85104 - PT Therapeutic Exercise Minutes 10  -MW         Exercise 1    Exercise Name 1 Issued and performed balance HEP, see for details.  -MW      Time 1 10 min  -MW        User Key  (r) = Recorded By, (t) = Taken By, (c) = Cosigned By    Initials Name Provider Type    Deysi Isaac PT Physical Therapist                      Outcome Measure Options: 10 Meter Walk, Duval Balance, FGA (Functional Gait Assessment), Timed Up and Go (TUG), 30 Second Chair Stand Test  10 Meter Walk Test Self-Selected Velocity  Self-Selected Velocity: Trial 1: 13.43 sec.  10 Meter Walk Test Fast Velocity  10 Meter Walk Fast Velocity: Trial 1: 11.53 sec.  30 Second Chair Stand Test  30 Second Chair Stand Test: 6  Duval Balance Scale  Sitting to Standing: able to stand without using hands and stabilize independently  Standing Unsupported: able to stand 2 minutes with supervision  Sitting with Back Unsupported but Feet Supported on Floor or on Stool: able to sit safely and securely for 2 minutes  Standing to Sitting: sits independently but has uncontrolled descent  Transfers: able to transfer safely definite need of hands  Standing Unsupported with Eyes Closed: able to stand 10 seconds with supervision  Standing Unsupported with Feet Together: able to place feet together independently and stand 1 minute with supervision  Reaching Forward with Outstretched Arm While Standing: can reach forward 5 cm (2 inches)   Object From the Floor From a Standing Position: able to  object but needs supervision  Turning to Look Behind Over Left and Right Shoulders While Standing: turns sideways only but maintains balance  Turn  360 Degrees: needs close supervision or verbal cuing  Place Alternate Foot on Step or Stool While Standing Unsupported: able to complete > 2 steps needs minimal assist  Standing Unsupported with One Foot in Front: able to take small step independently and hold 30 seconds  Standing on One Leg: unable to try of needs assist to prevent fall  Duval Total Score: 32  Functional Gait Assessment (FGA)  Gait Level Surface: Moderate Impairment  Change in Gait Speed: Moderate Impairment  Gait with Horizontal Head Turns: Moderate Impairment  Gait with Vertical Head Turns: Moderate Impairment  Gait and Pivot Turn: Moderate Impairment  Step Over Obstacle: Moderate Impairment  Gait with Narrow Base of Support: Severe Impairment  Gait with Eyes Closed: Severe Impairment  Ambulating Backwards: Moderate Impairment  Steps: Mild Impairment  FGA Total Score: 9  Timed Up and Go (TUG)  TUG Test 1: 14.54 seconds    Time Calculation:   Start Time: 1100   Therapy Suggested Charges     Code   Minutes Charges    67948 (CPT®) Hc Pt Neuromusc Re Education Ea 15 Min      59667 (CPT®) Hc Pt Ther Proc Ea 15 Min 10 1    86598 (CPT®) Hc Gait Training Ea 15 Min      60920 (CPT®) Hc Pt Therapeutic Act Ea 15 Min      05251 (CPT®) Hc Pt Manual Therapy Ea 15 Min      17166 (CPT®) Hc Pt Ther Massage- Per 15 Min      35784 (CPT®) Hc Pt Iontophoresis Ea 15 Min      41778 (CPT®) Hc Pt Elec Stim Ea-Per 15 Min      00054 (CPT®) Hc Pt Ultrasound Ea 15 Min      32879 (CPT®) Hc Pt Self Care/Mgmt/Train Ea 15 Min      44513 (CPT®) Hc Pt Prosthetic (S) Train Initial Encounter, Each 15 Min      15397 (CPT®) Hc Orthotic(S) Mgmt/Train Initial Encounter, Each 15min      20370 (CPT®) Hc Pt Aquatic Therapy Ea 15 Min      99732 (CPT®) Hc Pt Orthotic(S)/Prosthetic(S) Encounter, Each 15 Min      Total  10 1        Therapy Charges for Today     Code Description Service Date Service Provider Modifiers Qty    07875534191 HC PT MOBILITY CURRENT 9/10/2018 Deysi Hatch, PT GP,  CL 1    91794344607 HC PT MOBILITY PROJECTED 9/10/2018 Deysi Hatch, PT GP, CJ 1    52853117819 HC PT THER PROC EA 15 MIN 9/10/2018 Deysi Hatch, PT GP 1    36746947947 HC PT EVAL MOD COMPLEXITY 4 9/10/2018 Deysi Hatch, PT GP 1          PT G-Codes  Outcome Measure Options: 10 Meter Walk, [place] Balance, FGA (Functional Gait Assessment), Timed Up and Go (TUG), 30 Second Chair Stand Test  Duval Total Score: 32  FGA Total Score: 9  TUG Test 1: 14.54 seconds  Functional Limitation: Mobility: Walking and moving around  Mobility: Walking and Moving Around Current Status (): At least 60 percent but less than 80 percent impaired, limited or restricted  Mobility: Walking and Moving Around Goal Status (): At least 20 percent but less than 40 percent impaired, limited or restricted         Deysi Hatch, PT  9/10/2018

## 2018-09-12 ENCOUNTER — HOSPITAL ENCOUNTER (OUTPATIENT)
Dept: OCCUPATIONAL THERAPY | Facility: HOSPITAL | Age: 83
Setting detail: THERAPIES SERIES
Discharge: HOME | End: 2018-09-12

## 2018-09-12 DIAGNOSIS — Z74.09 DECREASED FUNCTIONAL MOBILITY AND ENDURANCE: Primary | ICD-10-CM

## 2018-09-12 DIAGNOSIS — Z78.9 DECREASED INDEPENDENCE WITH ACTIVITIES OF DAILY LIVING: ICD-10-CM

## 2018-09-12 PROCEDURE — 97110 THERAPEUTIC EXERCISES: CPT | Performed by: OCCUPATIONAL THERAPIST

## 2018-09-12 NOTE — THERAPY TREATMENT NOTE
"Outpatient Occupational Therapy Neuro Treatment Note  [facility]     Patient Name: Daly Gauthier  : 1935  MRN: [id number]  Today's Date: 2018       Visit Date: 2018    Patient Active Problem List   Diagnosis   • Mild cognitive impairment   • History of right breast cancer   • Cognitive impairment        Past Medical History:   Diagnosis Date   • Anxiety    • Depression    • Diabetes mellitus (CMS/HCC)    • Hyperlipidemia    • Hypertension         No past surgical history on file.      Visit Dx:    ICD-10-CM ICD-9-CM   1. Decreased functional mobility and endurance Z74.09 780.99   2. Decreased independence with activities of daily living Z65.8 V62.89                         OT Assessment/Plan     Row Name 18 1100          OT Assessment    Assessment Comments Pt demo good participation; however continues to report she \"isn't feeling well\" and reports c/o dizziness or allergies. She demo fair understanding of new HEP and  demo good understanding with good encouragement for patient. Pt continues to require VC for increased posture and increasing step length.   -EM        OT Plan    OT Plan Comments Continue per POC  -EM       User Key  (r) = Recorded By, (t) = Taken By, (c) = Cosigned By    Initials Name Provider Type    Mary Jo Bashir, OTR Occupational Therapist              Therapy Education  Given: HEP  Program: New, Reinforced  How Provided: Verbal, Demonstration  Provided to: Patient, Caregiver  Level of Understanding: Verbalized, Demonstrated              OT Exercises     Row Name 18 1100             Precautions    Existing Precautions/Restrictions no known precautions/restrictions  -EM         Subjective Comments    Subjective Comments Pt reports her allergies are bothering her today  -EM         Subjective Pain    Able to rate subjective pain? yes  -EM      Pre-Treatment Pain Level 0  -EM      Post-Treatment Pain Level 0  -EM         Exercise 1    Exercise Name 1 " energy and strength for ADL tasks  -EM      Time (Minutes) 1 12 mins on nu-step at level 6 with avg RPMs at 29.   -EM         Exercise 2    Exercise Name 2 Educated and completed hand weight HEP using 2# dumbbells while standing with feet together. SBA for balance and phyiscal demo required for understanding new exercises.  -EM         Exercise 3    Exercise Name 3 Pt completed walking through agility ladder while carrying 7.5# medicine ball with SBA and mod-max VC to increase step length. focusing on increasing funcitonal mobility while carrying items (such as laundry basket). Completed 3 laps and then stepped laterally through agility ladder with close SBA and VC for increasing step length. 3 laps.   -EM        User Key  (r) = Recorded By, (t) = Taken By, (c) = Cosigned By    Initials Name Provider Type    Mary Jo Bashir, OTR Occupational Therapist                        Time Calculation:   OT Start Time: 1100     Therapy Suggested Charges     Code   Minutes Charges    70640 (CPT®) Hc Ot Neuromusc Re Education Ea 15 Min      69499 (CPT®) Hc Ot Ther Proc Ea 15 Min 45 3    21305 (CPT®) Hc Ot Therapeutic Act Ea 15 Min      23216 (CPT®) Hc Ot Manual Therapy Ea 15 Min      07978 (CPT®) Hc Ot Iontophoresis Ea 15 Min      22322 (CPT®) Hc Ot Elec Stim Ea-Per 15 Min      46961 (CPT®) Hc Ot Ultrasound Ea 15 Min      15031 (CPT®) Hc Ot Self Care/Mgmt/Train Ea 15 Min      Total  45 3          Therapy Charges for Today     Code Description Service Date Service Provider Modifiers Qty    81413722064 HC OT THER PROC EA 15 MIN 9/12/2018 Mary Jo Hurd OTR GO 3                    DEB Delgadillo  9/12/2018

## 2018-09-17 ENCOUNTER — HOSPITAL ENCOUNTER (OUTPATIENT)
Dept: SPEECH THERAPY | Facility: HOSPITAL | Age: 83
Setting detail: THERAPIES SERIES
Discharge: HOME | End: 2018-09-17

## 2018-09-17 DIAGNOSIS — G31.84 MILD COGNITIVE IMPAIRMENT: ICD-10-CM

## 2018-09-17 DIAGNOSIS — R41.89 COGNITIVE IMPAIRMENT: Primary | ICD-10-CM

## 2018-09-17 PROCEDURE — G9169 MEMORY GOAL STATUS: HCPCS

## 2018-09-17 PROCEDURE — G9168 MEMORY CURRENT STATUS: HCPCS

## 2018-09-17 PROCEDURE — 92507 TX SP LANG VOICE COMM INDIV: CPT

## 2018-09-17 NOTE — THERAPY PROGRESS REPORT/RE-CERT
Outpatient Speech Language Pathology   Adult Speech Language Cognitive Progress Note  [facility]     Patient Name: Daly Gauthier  : 1935  MRN: [id number]  Today's Date: 2018         Visit Date: 2018   Patient Active Problem List   Diagnosis   • Mild cognitive impairment   • History of right breast cancer   • Cognitive impairment          Visit Dx:    ICD-10-CM ICD-9-CM   1. Cognitive impairment R41.89 294.9   2. Mild cognitive impairment G31.84 331.83                               SLP OP Goals     Row Name 18 1000          Goal Type Needed    Goal Type Needed Memory;Other Adult Goals  -HG        Subjective Comments    Subjective Comments Pt alert, cooperative, accompanied with .   -HG        Memory Goals    Patient will be able to remember  information needed to participate in activities of daily living at least 2-3 a day with min verbal and visual cues.  -HG     Status: Patient will be able to remember  information needed to participate in activities of daily living Progressing as expected  -HG     Comments: Patient will be able to remember  information needed to participate in activities of daily living 18: Pt using journal with no prompting from . 18; Pt continues to be prompted by  daily and instructed by SLP the purpose of journaling. 18: Pt was cued to continue to write in her journal. 18: Pt partially completed on her own and SLP guided the entry for this date in order to provide a model for upcoming days. 18: Incorporated the use of a daily journal during session in order to improve recall of immediate and recent information- pt required max cues in order to complete the task.   -HG     Patient’s memory skills will be enhanced as reported by patient by using external memory aides 80%:;with cues  -HG     Status: Patient’s memory skills will be enhanced as reported by patient by using external memory aides Progressing as expected  -HG      Comments: Patient’s memory skills will be enhanced as reported by patient by using external memory aides 9/17/18: Pt using journal at home on a daily basis. 8/27/18: Pt had an entry for every day since last session.  8/20/18: Initiated journal writing during session and pt required mod cues. 8/13/18: Journal writing: an entry is made for every day but not detailed in nature. 8/6/18: Pt states she doesn't enjoy writing in it but she does it anyway. She feels that her life is boring. 7/13/18: Started the use of a daily journal this date and provided an outline of what information could be filled in each day.   -HG     Patient will demonstrate improved ability to recall information by listening to paragraph and answering yes/no questions 80%:;with cues  -HG     Status: Patient will demonstrate improved ability to recall information by listening to paragraph and answering yes/no questions New;Progressing as expected  -HG     Comments: Patient will demonstrate improved ability to recall information by listening to paragraph and answering yes/no questions 9/17/18: Immediate recall on SLUMS for paragraph info, pt was 1/8. 8/6/18: Name and face association: pt could not recall indepdendenty but with cues, given choices,pt was 60% accurate. 7/27/18: 22-36 word paragraphs: pt was 60% accurate.   -HG     Patient will demonstrate improved ability to recall information by stating activities patient has completed that day 80%:;with cues  -HG     Status: Patient will demonstrate improved ability to recall information by stating activities patient has completed that day Progressing as expected  -HG     Comments: Patient will demonstrate improved ability to recall information by stating activities patient has completed that day 9/17/18: SLUMS Delayed recall: pt was 5/5 for 5 un-related items. 8/27/18: Minimal progress on memoy book due to slow processing. 8/20/18: Cont'd work on the Memory Book with assist from her .  8/13/18: Started a memory book and pt was 70% with cues from her . 8/6/18: Pt is writing daily with prompts from her . 7/13/18: Introduced use of journal during session in order to record all daily activites.   -HG        Other Goals    Other Adult Goal- 1 Pt will complete divergent and convergent naming with 80% accuracy with min cues.  -HG     Status: Other Adult Goal- 1 Progressing as expected  -HG     Comments: Other Adult Goal- 1 9/17/18: Pt able to name 13/15 on the SLUMS. 8/20/18: Sequencing 5 steps: pt was 70% accurate. 8/13/18: Convergent naming: pt was ? 8/6/18: Divergent naming: pt was 80% accurate.  7/27/18: Spelling words that are cut in half; matching first half to last half with a 50% accurayc with mod cues.  Thought organization homework: pt was 75% accurate with cues needed at home.  7/13/18: Convergent naming: pt was 95% accurate.   -HG     Other Adult Goal- 2 Pt will complete reasoning tasks with 80% accuracy.   -HG     Status: Other Adult Goal- 2 Progressing as expected  -HG     Comments: Other Adult Goal- 2 9/17/18: SLUMS clock drawing, forgot the number 1 and the time was in-correct. 8/27/18: Crossword puzzle: pt was 100% accurate. 8/20/18: Crossword puzzle: pt was 90% accurate. 8/13/18: fill in the letters: pt was 50% accurate with min cues. 8/6/18: 4 step sequencing: pt was 100% accurate; written directions: pt was 25% accurate. 7/27/18: For 4 step sequencing, pt was 80% accurate with mod cues. 7/13/18: Play unfamiliar game of [employer] and pt required max cues in order to participate.   -HG     Other Adult Goal- 3 Pt will improve SLUMS score to at least a 20 falling in the MNCD range.   -HG     Status: Other Adult Goal- 3 Progressing as expected  -HG     Comments: Other Adult Goal- 3 9/17/18: Pt improved score to a 17/30.   -HG        SLP Time Calculation    SLP Goal Re-Cert Due Date 10/17/18  -HG       User Key  (r) = Recorded By, (t) = Taken By, (c) = Cosigned By    Initials  Name Provider Type     Diana Fish MS CCC-SLP Speech and Language Pathologist                OP SLP Education     Row Name 09/17/18 1000       Education    Barriers to Learning No barriers identified  -    Education Provided Described results of evaluation;Patient expressed understanding of evaluation;Family/caregivers expressed understanding of evaluation;Patient participated in establishing goals and treatment plan;Family/caregivers participated in establishing goals and treatment plan;Patient demonstrated recommended strategies;Family/caregivers demonstrated recommended strategies;Patient requires further education on strategies, risks;Family/caregivers require further education on strategies, risks  -    Assessed Learning needs;Learning motivation;Learning preferences;Learning readiness  -    Learning Motivation Strong  -    Learning Method Explanation;Demonstration;Teach back;Written materials  -    Teaching Response Verbalized understanding;Demonstrated understanding;Reinforcement needed  -    Education Comments Pt encouraged to use the journal at home ALL throughout the day to improve recall abilities.   -      User Key  (r) = Recorded By, (t) = Taken By, (c) = Cosigned By    Initials Name Effective Dates     Diana Fish MS [facility]-SLP 06/22/15 -                 OP SLP Assessment/Plan - 09/17/18 1000        SLP Assessment    Functional Problems Speech Language- Adult/Cognition  -    Impact on Function: Adult Speech Language/Cognition Difficulty communicating in a medical emergency;Restrictions in personal and social life;Difficulty sequencing thoughts to express complex messages;Difficulty following directions;Unrealistic view of abilities/deficits;Difficulty sequencing or problem solving to complete ADLs;Lack of insight or awareness of deficits, safety issues;Trouble learning or remembering new information;Poor attention to task;Poor judgment;Requires supervision  -     Clinical Impression: Speech Language-Adult/Congnition Moderate-Severe:;Cognitive Communication Impairment  -HG    Functional Problems Comment Pt continues to thrive due to her  however pt's  reports that he doesn't have to prompt her to write in her journal, she is doing it Independently.   -HG    Clinical Impression Comments SLUMS improved score to a 17, up from an  (wrong score from assessment)  -HG    Please refer to paper survey for additional self-reported information Yes  -HG    Please refer to items scanned into chart for additional diagnostic informaiton and handouts as provided by clinician Yes  -HG    SLP Diagnosis Moderate to Severe Cognitive Therapy  -HG    Prognosis Excellent (comment)  -HG    Patient/caregiver participated in establishment of treatment plan and goals Yes  -HG    Patient would benefit from skilled therapy intervention Yes  -HG       SLP Plan    Frequency 1x/week  -HG    Duration 2 weeks  -HG    Planned CPT's? SLP INDIVIDUAL SPEECH THERAPY: 53194  -    Expected Duration Therapy Session - minutes 45-60 minutes  -HG    Plan Comments Cont with Cog tx.   -HG      User Key  (r) = Recorded By, (t) = Taken By, (c) = Cosigned By    Initials Name Provider Type     Diana Fish, MS CCC-SLP Speech and Language Pathologist                 Time Calculation:   SLP Start Time: 1000    Therapy Charges for Today     Code Description Service Date Service Provider Modifiers Qty    91817983330 HC ST MEMORY CURRENT 9/17/2018 Diana Fish MS CCC-SLP GN, CK 1    98312849903 HC ST MEMORY PROJECTED 9/17/2018 Diana Fish MS CCC-SLP GN, CJ 1    21994284915 HC ST TREATMENT SPEECH 4 9/17/2018 Diana Fish MS CCC-SLP GN 1          SLP G-Codes  SLP NOMS Used?: Yes  Functional Limitations: Memory  Memory Current Status (): At least 40 percent but less than 60 percent impaired, limited or restricted  Memory Goal Status (): At least 20 percent but less than 40 percent  impaired, limited or restricted        Diana Fish, MS CCC-SLP  9/17/2018

## 2018-09-18 ENCOUNTER — HOSPITAL ENCOUNTER (OUTPATIENT)
Dept: PHYSICAL THERAPY | Facility: HOSPITAL | Age: 83
Setting detail: THERAPIES SERIES
Discharge: HOME | End: 2018-09-18

## 2018-09-18 DIAGNOSIS — R26.9 GAIT ABNORMALITY: Primary | ICD-10-CM

## 2018-09-18 DIAGNOSIS — R26.89 BALANCE PROBLEM: ICD-10-CM

## 2018-09-18 PROCEDURE — 97110 THERAPEUTIC EXERCISES: CPT | Performed by: PHYSICAL THERAPIST

## 2018-09-18 PROCEDURE — 97116 GAIT TRAINING THERAPY: CPT | Performed by: PHYSICAL THERAPIST

## 2018-09-18 PROCEDURE — 97112 NEUROMUSCULAR REEDUCATION: CPT | Performed by: PHYSICAL THERAPIST

## 2018-09-18 NOTE — THERAPY TREATMENT NOTE
"    Outpatient Physical Therapy Neuro Treatment Note  [facility]     Patient Name: Daly Gauthier  : 1935  MRN: [id number]  Today's Date: 2018      Visit Date: 2018    Visit Dx:    ICD-10-CM ICD-9-CM   1. Gait abnormality R26.9 781.2   2. Balance problem R26.89 781.99       Patient Active Problem List   Diagnosis   • Mild cognitive impairment   • History of right breast cancer   • Cognitive impairment                 PT Neuro     Row Name 18 1100             Subjective Pain    Able to rate subjective pain? yes  -MW      Pre-Treatment Pain Level 0  -MW      Post-Treatment Pain Level 0  -MW         Gait/Stairs Assessment/Training    58493 - Gait Training Minutes  10  -MW      Comment (Gait/Stairs) Ambulation 200' with toss/catch and bounce/catch ball with min A and standing rest break secondary to fatigue.    -MW         Balance Skills Training    Training Strategies (Balance) St balance on blue foam with alternating stepping one foot to 10\" step x 10 and then hold one foot on step with B cervical rot x 10 without UE A with min/mod A with pt fearful of not using UE A.  B fwd lunge to BOSU with B UE A x 10, hold lunge to BOSU without UE A with B shoulder flexion overhead x 5 and trunk rot B x 5 with min/mod A.  Fwd ambulation along balance beam without UE A with min A, LE behind in staggered standing stepping fwd/back x 10 with min/mod A for balance.  -        User Key  (r) = Recorded By, (t) = Taken By, (c) = Cosigned By    Initials Name Provider Type    Deysi Isaac, PT Physical Therapist                        PT Assessment/Plan     Row Name 18 1100          PT Assessment    Assessment Comments Pt. requires min/mod A with standing balance activities.  Pt. has fear of falling and LOB when attempting to perform activities without UE A.  Pt. to benefit from continued therapy services to meet goals.Pt. demonstrates increased L LE knee flexion throughout secondary to pt " "reporting it being a \"bad knee.\"  -MW        PT Plan    PT Plan Comments Continue with PT services to improve gait, balance, strength, transfers and overall functional mobility.  -MW       User Key  (r) = Recorded By, (t) = Taken By, (c) = Cosigned By    Initials Name Provider Type    Deysi Isaac, PT Physical Therapist                     Exercises     Row Name 09/18/18 1100             Subjective Comments    Subjective Comments \"Those exercises are hard.\"  -MW         Subjective Pain    Able to rate subjective pain? yes  -MW      Pre-Treatment Pain Level 0  -MW      Post-Treatment Pain Level 0  -MW         Total Minutes    66844 - Gait Training Minutes  10  -MW      48863 - PT Therapeutic Exercise Minutes 10  -MW      05409 -  PT Neuromuscular Reeducation Minutes 35  -MW         Exercise 1    Exercise Name 1 NuStep L6  -MW      Time 1 10 min  -MW        User Key  (r) = Recorded By, (t) = Taken By, (c) = Cosigned By    Initials Name Provider Type    Deysi Isaac, PT Physical Therapist                            Therapy Education  Given: Symptoms/condition management, Posture/body mechanics, Mobility training  Program: Reinforced  How Provided: Verbal  Provided to: Patient  Level of Understanding: Verbalized              Time Calculation:   Start Time: 1100   Therapy Suggested Charges     Code   Minutes Charges    78133 (CPT®)  Pt Neuromusc Re Education Ea 15 Min 35 2    33047 (CPT®)  Pt Ther Proc Ea 15 Min 10 1    82838 (CPT®) Hc Gait Training Ea 15 Min 10 1    82873 (CPT®)  Pt Therapeutic Act Ea 15 Min      28286 (CPT®)  Pt Manual Therapy Ea 15 Min      38826 (CPT®)  Pt Ther Massage- Per 15 Min      73574 (CPT®)  Pt Iontophoresis Ea 15 Min      88206 (CPT®)  Pt Elec Stim Ea-Per 15 Min      30925 (CPT®)  Pt Ultrasound Ea 15 Min      14007 (CPT®)  Pt Self Care/Mgmt/Train Ea 15 Min      52290 (CPT®)  Pt Prosthetic (S) Train Initial Encounter, Each 15 Min      84181 (CPT®)  " Orthotic(S) Mgmt/Train Initial Encounter, Each 15min      69903 (CPT®) Hc Pt Aquatic Therapy Ea 15 Min      38486 (CPT®) Hc Pt Orthotic(S)/Prosthetic(S) Encounter, Each 15 Min      Total  55 4        Therapy Charges for Today     Code Description Service Date Service Provider Modifiers Qty    70632350679 HC PT NEUROMUSC RE EDUCATION EA 15 MIN 9/18/2018 Deysi Hatch, PT GP 2    51586378965 HC PT THER PROC EA 15 MIN 9/18/2018 Deysi Hatch, PT GP 1    70397572326 HC GAIT TRAINING EA 15 MIN 9/18/2018 Deysi Hatch, PT GP 1                    Deysi Hatch, PT  9/18/2018

## 2018-09-19 ENCOUNTER — HOSPITAL ENCOUNTER (OUTPATIENT)
Dept: OCCUPATIONAL THERAPY | Facility: HOSPITAL | Age: 83
Setting detail: THERAPIES SERIES
Discharge: HOME | End: 2018-09-19

## 2018-09-19 DIAGNOSIS — Z74.09 DECREASED FUNCTIONAL MOBILITY AND ENDURANCE: Primary | ICD-10-CM

## 2018-09-19 DIAGNOSIS — Z78.9 DECREASED INDEPENDENCE WITH ACTIVITIES OF DAILY LIVING: ICD-10-CM

## 2018-09-19 PROCEDURE — 97110 THERAPEUTIC EXERCISES: CPT | Performed by: OCCUPATIONAL THERAPIST

## 2018-09-19 NOTE — THERAPY PROGRESS REPORT/RE-CERT
Outpatient Occupational Therapy Neuro Progress Note  [facility]     Patient Name: Daly Gauthier  : 1935  MRN: [id number]  Today's Date: 2018       Visit Date: 2018    Patient Active Problem List   Diagnosis   • Mild cognitive impairment   • History of right breast cancer   • Cognitive impairment        Past Medical History:   Diagnosis Date   • Anxiety    • Depression    • Diabetes mellitus (CMS/HCC)    • Hyperlipidemia    • Hypertension         No past surgical history on file.      Visit Dx:    ICD-10-CM ICD-9-CM   1. Decreased functional mobility and endurance Z74.09 780.99   2. Decreased independence with activities of daily living Z65.8 V62.89             OT Neuro     Row Name 18 1100             Subjective Comments    Subjective Comments Pt reports she never feels up to coming but her  encourages her to come anyway  -EM         Subjective Pain    Able to rate subjective pain? yes  -EM      Pre-Treatment Pain Level 8  -EM      Subjective Pain Comment Pt reports headache, stomach ache  -EM         Coordination    9-Hole Peg Left 35.05  -EM      9-Hole Peg Right 29.22  -EM         Upper Extremity (Manual Muscle Testing)    Comment, MMT: Upper Extremity gross MMT B UEs 4/5  -EM         ADL Assessment/Intervention    Comment, IADL Assessment/Training Pt continues to report increased time for buttons and  helps with ADL tasks as needed.    -EM        User Key  (r) = Recorded By, (t) = Taken By, (c) = Cosigned By    Initials Name Provider Type    Mary Jo Bashir OTR Occupational Therapist             Hand Therapy (last 24 hours)      Hand Eval     Row Name 18 1100              Strength Right    Right  Test 1 35  -EM      Right  Test 2 40  -EM      Right  Test 3 40  -EM       Strength Average Right 38.33  -EM          Strength Left    Left  Test 1 41  -EM      Left  Test 2 40  -EM      Left  Test 3 40  -EM       Strength Average  Left 40.33  -EM         Right Hand Strength - Pinch (lbs)    Lateral 8.3 lbs  -EM         Left Hand Strength - Pinch (lbs)    Lateral 7 lbs  -EM        User Key  (r) = Recorded By, (t) = Taken By, (c) = Cosigned By    Initials Name Provider Type    Mary Jo Bashir, OTR Occupational Therapist                    OT Assessment/Plan     Row Name 09/19/18 1100          OT Assessment    Functional Limitations Decreased safety during functional activities;Limitation in home management;Limitations in community activities;Performance in self-care ADL;Performance in leisure activities;Limitations in functional capacity and performance  -EM     Impairments Balance;Coordination;Dexterity;Endurance;Motor function;Posture;Pain;Muscle strength;Cognition  -EM     Assessment Comments Pt demo increased coordination bilaterally on 9 HPT and increased  strength bilaterally. She continues to require encouragement to participate; however demo good understanding of need to continue with HEPs with good support from .   -EM        OT Plan    OT Frequency 1x/week  -EM     Predicted Duration of Therapy Intervention (Therapy Eval) 6 visits  -EM     Planned CPT's? OT EVAL HIGH COMPLEXITY: 68497;OT SELF CARE/MGMT/TRAIN 15 MIN: 97336;OT NEUROMUSC RE EDUCATION EA 15 MIN: 97771;OT THER PROC EA 15 MIN: 67795GK;OT THER ACT EA 15 MIN: 37813GN  -EM     Planned Therapy Interventions (Optional Details) balance training;home exercise program;manual therapy techniques;motor coordination training;neuromuscular re-education;transfer training;stretching;strengthening;ROM (Range of Motion);postural re-education;patient/family education;other (see comments)  -EM     OT Plan Comments Continue per initial POC to further progress toward goals as specified.   -EM       User Key  (r) = Recorded By, (t) = Taken By, (c) = Cosigned By    Initials Name Provider Type    Mary Jo Bashir, OTR Occupational Therapist                OT Goals     Row Name 09/19/18  1100          OT Short Term Goals    STG Date to Achieve 10/17/18  -EM     STG 1 Pt and  will be educated in HEP to increase UB and hand strength for increased independence with ADLs  -EM     STG 1 Progress Met  -EM     STG 2 Patient will complete dynamic standing balance activities while incorporating B UEs into tasks with min A   -EM     STG 2 Progress Met  -EM     STG 3 Patient will complete handwriting and coordination HEP to increase independence with writing cards/letters to family members as well as other FMC throughout daily routine  -EM     STG 3 Progress Ongoing  -EM        Long Term Goals    LTG Date to Achieve 11/14/18  -EM     LTG 1 Pt and  will be independent with HEPs using written instructions  -EM     LTG 1 Progress Ongoing  -EM     LTG 2 Patient will complete dynamic standing balance activities while incorporating B UEs into tasks with SBA  -EM     LTG 2 Progress Ongoing  -EM     LTG 3 Patient will score < 40 secs on 9 HPT bilaterally to indicate increased independence with ADL tasks.   -EM     LTG 3 Progress Met  -EM     LTG 4 Pt will score < 34 secs on 9 HPT with R hand to indicate increased coordination for ADL tasks  -EM     LTG 4 Progress New  -EM       User Key  (r) = Recorded By, (t) = Taken By, (c) = Cosigned By    Initials Name Provider Type    Mary Jo Bashir, OTR Occupational Therapist          Therapy Education  Given: HEP, Posture/body mechanics  Program: Reinforced  How Provided: Verbal, Demonstration  Provided to: Patient  Level of Understanding: Verbalized, Demonstrated              OT Exercises     Row Name 09/19/18 1100             Subjective Pain    Post-Treatment Pain Level 7  -EM         Total Minutes    50332 - OT Therapeutic Exercise Minutes 53  -EM         Exercise 1    Exercise Name 1 energy and strength for ADL tasks  -EM      Time (Minutes) 1 12 mins on nu-step at level 7  -EM         Exercise 2    Exercise Name 2 Reviewed and completed hand weight HEP  using 2# dumbbells while standing with feet together. SBA for balance and phyiscal demo required for understanding new exercises.  -EM         Exercise 3    Exercise Name 3 Pt stood on airex while using firm putty and small pegs focusing on in-hand manipulation, translation, tip pinch, and lateral pinch. SBA throughout for balance  -EM         Exercise 4    Exercise Name 4 Reassessment completed.  -EM        User Key  (r) = Recorded By, (t) = Taken By, (c) = Cosigned By    Initials Name Provider Type    Mary Jo Bashir, OTR Occupational Therapist              9 Hole Peg  9-Hole Peg Left: 35.05  9-Hole Peg Right: 29.22  How much help from another is currently needed...  Putting on and taking off regular lower body clothing?: none  Bathing (including washing, rinsing, and drying): a little  Toileting (which includes using toilet bed pan or urinal): none  Putting on and taking off regular upper body clothing: a little  Taking care of personal grooming (such as brushing teeth): none  Eating meals: none  Score: 22         Time Calculation:   OT Start Time: 1100     Therapy Suggested Charges     Code   Minutes Charges    66354 (CPT®) Hc Ot Neuromusc Re Education Ea 15 Min      34706 (CPT®) Hc Ot Ther Proc Ea 15 Min 53 4    94615 (CPT®) Hc Ot Therapeutic Act Ea 15 Min      40311 (CPT®) Hc Ot Manual Therapy Ea 15 Min      21093 (CPT®) Hc Ot Iontophoresis Ea 15 Min      43198 (CPT®) Hc Ot Elec Stim Ea-Per 15 Min      86198 (CPT®) Hc Ot Ultrasound Ea 15 Min      95421 (CPT®) Hc Ot Self Care/Mgmt/Train Ea 15 Min      Total  53 4          Therapy Charges for Today     Code Description Service Date Service Provider Modifiers Qty    26125341371 HC OT THER PROC EA 15 MIN 9/19/2018 Mary Jo Hurd OTR GO 4                    DEB Delgadillo  9/19/2018

## 2018-09-24 ENCOUNTER — HOSPITAL ENCOUNTER (OUTPATIENT)
Dept: PHYSICAL THERAPY | Facility: HOSPITAL | Age: 83
Setting detail: THERAPIES SERIES
Discharge: HOME | End: 2018-09-24

## 2018-09-24 ENCOUNTER — HOSPITAL ENCOUNTER (OUTPATIENT)
Dept: SPEECH THERAPY | Facility: HOSPITAL | Age: 83
Setting detail: THERAPIES SERIES
Discharge: HOME | End: 2018-09-24

## 2018-09-24 DIAGNOSIS — R26.89 BALANCE PROBLEM: ICD-10-CM

## 2018-09-24 DIAGNOSIS — G31.84 MILD COGNITIVE IMPAIRMENT: ICD-10-CM

## 2018-09-24 DIAGNOSIS — R26.9 GAIT ABNORMALITY: Primary | ICD-10-CM

## 2018-09-24 DIAGNOSIS — R41.89 COGNITIVE IMPAIRMENT: Primary | ICD-10-CM

## 2018-09-24 PROCEDURE — 97110 THERAPEUTIC EXERCISES: CPT | Performed by: PHYSICAL THERAPIST

## 2018-09-24 PROCEDURE — G9170 MEMORY D/C STATUS: HCPCS

## 2018-09-24 PROCEDURE — G9169 MEMORY GOAL STATUS: HCPCS

## 2018-09-24 PROCEDURE — G9168 MEMORY CURRENT STATUS: HCPCS

## 2018-09-24 PROCEDURE — 92507 TX SP LANG VOICE COMM INDIV: CPT

## 2018-09-24 PROCEDURE — 97112 NEUROMUSCULAR REEDUCATION: CPT | Performed by: PHYSICAL THERAPIST

## 2018-09-24 NOTE — THERAPY DISCHARGE NOTE
"Outpatient Speech Language Pathology   Adult Speech Language Cognitive Treatment Note/Discharge Summary   [facility]     Patient Name: Daly Gauthier  : 1935  MRN: [id number]  Today's Date: 2018         Visit Date: 2018   Patient Active Problem List   Diagnosis   • Mild cognitive impairment   • History of right breast cancer   • Cognitive impairment          Visit Dx:    ICD-10-CM ICD-9-CM   1. Cognitive impairment R41.89 294.9   2. Mild cognitive impairment G31.84 331.83                             SLP OP Goals     Row Name 18 1000          Goal Type Needed    Goal Type Needed Memory;Other Adult Goals  -HG        Subjective Comments    Subjective Comments Pt alert, cooperative, accompanied with  and said she had been sick this morning and \"feels terrible\"  says they're seeing her behavorial specialist this week and going to discuss depression medication.   -HG        Memory Goals    Patient will be able to remember  information needed to participate in activities of daily living at least 2-3 a day with min verbal and visual cues.  -HG     Status: Patient will be able to remember  information needed to participate in activities of daily living Achieved  -HG     Comments: Patient will be able to remember  information needed to participate in activities of daily living 18: Pt has written in her journal and with prompts and cues, pt is writing in it but at least one time a day. 18: Pt using journal with no prompting from . 18; Pt continues to be prompted by  daily and instructed by SLP the purpose of journaling. 18: Pt was cued to continue to write in her journal. 18: Pt partially completed on her own and SLP guided the entry for this date in order to provide a model for upcoming days. 18: Incorporated the use of a daily journal during session in order to improve recall of immediate and recent information- pt required max cues in order " to complete the task.   -HG     Patient’s memory skills will be enhanced as reported by patient by using external memory aides 80%:;with cues  -HG     Status: Patient’s memory skills will be enhanced as reported by patient by using external memory aides Achieved  -HG     Comments: Patient’s memory skills will be enhanced as reported by patient by using external memory aides 9/24/18: Pt to continue to write in her journal. 9/17/18: Pt using journal at home on a daily basis. 8/27/18: Pt had an entry for every day since last session.  8/20/18: Initiated journal writing during session and pt required mod cues. 8/13/18: Journal writing: an entry is made for every day but not detailed in nature. 8/6/18: Pt states she doesn't enjoy writing in it but she does it anyway. She feels that her life is boring. 7/13/18: Started the use of a daily journal this date and provided an outline of what information could be filled in each day.   -HG     Patient will demonstrate improved ability to recall information by listening to paragraph and answering yes/no questions 80%:;with cues  -HG     Status: Patient will demonstrate improved ability to recall information by listening to paragraph and answering yes/no questions New;Progressing as expected  -HG     Comments: Patient will demonstrate improved ability to recall information by listening to paragraph and answering yes/no questions 9/17/18: Immediate recall on SLUMS for paragraph info, pt was 1/8. 8/6/18: Name and face association: pt could not recall indepdendenty but with cues, given choices,pt was 60% accurate. 7/27/18: 22-36 word paragraphs: pt was 60% accurate.   -HG     Patient will demonstrate improved ability to recall information by stating activities patient has completed that day 80%:;with cues  -HG     Status: Patient will demonstrate improved ability to recall information by stating activities patient has completed that day Progressing as expected  -HG     Comments:  Patient will demonstrate improved ability to recall information by stating activities patient has completed that day 9/17/18: SLUMS Delayed recall: pt was 5/5 for 5 un-related items. 8/27/18: Minimal progress on memoy book due to slow processing. 8/20/18: Cont'd work on the Memory Book with assist from her . 8/13/18: Started a memory book and pt was 70% with cues from her . 8/6/18: Pt is writing daily with prompts from her . 7/13/18: Introduced use of journal during session in order to record all daily activites.   -HG        Other Goals    Other Adult Goal- 1 Pt will complete divergent and convergent naming with 80% accuracy with min cues.  -HG     Status: Other Adult Goal- 1 Achieved  -HG     Comments: Other Adult Goal- 1 9/24/18: Pt was 10/10 x 3.  9/17/18: Pt able to name 13/15 on the SLUMS. 8/20/18: Sequencing 5 steps: pt was 70% accurate. 8/13/18: Convergent naming: pt was ? 8/6/18: Divergent naming: pt was 80% accurate.  7/27/18: Spelling words that are cut in half; matching first half to last half with a 50% accurayc with mod cues.  Thought organization homework: pt was 75% accurate with cues needed at home.  7/13/18: Convergent naming: pt was 95% accurate.   -HG     Other Adult Goal- 2 Pt will complete reasoning tasks with 80% accuracy.   -HG     Status: Other Adult Goal- 2 Progressing as expected  -HG     Comments: Other Adult Goal- 2 9/17/18: SLUMS clock drawing, forgot the number 1 and the time was in-correct. 8/27/18: Crossword puzzle: pt was 100% accurate. 8/20/18: Crossword puzzle: pt was 90% accurate. 8/13/18: fill in the letters: pt was 50% accurate with min cues. 8/6/18: 4 step sequencing: pt was 100% accurate; written directions: pt was 25% accurate. 7/27/18: For 4 step sequencing, pt was 80% accurate with mod cues. 7/13/18: Play unfamiliar game of [employer] 8 and pt required max cues in order to participate.   -HG     Other Adult Goal- 3 Pt will improve SLUMS score to at least a  20 falling in the MNCD range.   -HG     Status: Other Adult Goal- 3 Progressing as expected  -HG     Comments: Other Adult Goal- 3 9/17/18: Pt improved score to a 17/30.   -HG        SLP Time Calculation    SLP Goal Re-Cert Due Date 10/17/18  -HG       User Key  (r) = Recorded By, (t) = Taken By, (c) = Cosigned By    Initials Name Provider Type    FALLON Diana Fish MS CCC-SLP Speech and Language Pathologist                OP SLP Education     Row Name 09/24/18 1000       Education    Education Comments Pt completed Memory Book during session and given a home exercise program.   -HG      User Key  (r) = Recorded By, (t) = Taken By, (c) = Cosigned By    Initials Name Effective Dates    FALLON Diana Fish MS CCC-SLP 06/22/15 -                 OP SLP Assessment/Plan - 09/24/18 1000        SLP Plan    Plan Comments D/C from skilled services at this time.   -HG      User Key  (r) = Recorded By, (t) = Taken By, (c) = Cosigned By    Initials Name Provider Type    Diana Xie MS CCC-SLP Speech and Language Pathologist                   Time Calculation:   SLP Start Time: 1000    Therapy Charges for Today     Code Description Service Date Service Provider Modifiers Qty    97815298076 HC ST MEMORY CURRENT 9/24/2018 Polina, Diana DUMONT MS CCC-SLP GN, CK 1    37179127334 HC ST MEMORY PROJECTED 9/24/2018 Polina, Diana DUMONT MS CCC-SLP GN, CJ 1    29654468457 HC ST MEMORY DISCHARGE 9/24/2018 Polina Diana DUMONT MS CCC-SLP GN, CK 1    65949746338 HC ST TREATMENT SPEECH 4 9/24/2018 Diana Fish MS CCC-SLP GN 1          SLP G-Codes  SLP NOMS Used?: Yes  Functional Limitations: Memory  Memory Current Status (): At least 40 percent but less than 60 percent impaired, limited or restricted  Memory Goal Status (): At least 20 percent but less than 40 percent impaired, limited or restricted  Memory Discharge Status (): At least 40 percent but less than 60 percent impaired, limited or restricted              Diana Fish, MS CCC-SLP  9/24/2018

## 2018-09-24 NOTE — THERAPY TREATMENT NOTE
"    Outpatient Physical Therapy Neuro Treatment Note  [facility]     Patient Name: Daly Gauthier  : 1935  MRN: [id number]  Today's Date: 2018      Visit Date: 2018    Visit Dx:    ICD-10-CM ICD-9-CM   1. Gait abnormality R26.9 781.2   2. Balance problem R26.89 781.99       Patient Active Problem List   Diagnosis   • Mild cognitive impairment   • History of right breast cancer   • Cognitive impairment                 PT Neuro     Row Name 18 1345             Subjective Comments    Subjective Comments \"I still don't feel great today.\"  -MW         Subjective Pain    Able to rate subjective pain? yes  -MW      Pre-Treatment Pain Level 0  -MW      Post-Treatment Pain Level 0  -MW         Balance Skills Training    Training Strategies (Balance) St. balance with alternating tapping to cones x 10 with min A for appropriate wt shift and maintaining balance.  Standing balance on trampoline without UE A with narrow TWIN with B cervical rot/ext x 10 with min/mod A, staggered standing with B cervical rot/flex/ext x 5 with min/mod A.  Attempted jumping with B UE A x 10, B hip ab/adduction jump x 10 with B UE A with min/mod A.  -        User Key  (r) = Recorded By, (t) = Taken By, (c) = Cosigned By    Initials Name Provider Type    Deysi Isaac, PT Physical Therapist                        PT Assessment/Plan     Row Name 18 1345 18 1300       PT Assessment    Assessment Comments Pt requires min/mod A for balance activities and continues to demonstrate B hip and knee flexion with all activities. Pt. requires VCing to press through LE's and keep chest lifted.  Pt. requires seated rest breaks secondary to fatigue.  Pt. to benefit from skilled PT services to meet remaining goals.  -MW  --       PT Plan    PT Plan Comments Continue with PT services to improve gait, balance, strength, transfers and overall functional mobility.  -MW --  -      User Key  (r) = Recorded By, (t) = Taken " "By, (c) = Cosigned By    Initials Name Provider Type    Deysi Isaac, PT Physical Therapist                     Exercises     Row Name 09/24/18 1345             Subjective Comments    Subjective Comments \"I still don't feel great today.\"  -MW         Subjective Pain    Able to rate subjective pain? yes  -MW      Pre-Treatment Pain Level 0  -MW      Post-Treatment Pain Level 0  -MW         Total Minutes    15799 - PT Therapeutic Exercise Minutes 15  -MW      79022 -  PT Neuromuscular Reeducation Minutes 40  -MW         Exercise 1    Exercise Name 1 NuStep L6  -MW      Time 1 10 min  -MW         Exercise 2    Exercise Name 2 DLP  -MW      Sets 2 3  -MW      Time 2 1 min  -MW      Additional Comments min A and VCing for B knee extension  -MW         Exercise 3    Exercise Name 3 total gym B gastroc stretch  -MW      Sets 3 2  -MW      Time 3 45 sec hold  -MW         Exercise 4    Exercise Name 4 total gym B heel raises  -MW      Sets 4 2  -MW      Reps 4 10  -MW         Exercise 5    Exercise Name 5 B sidestepping with green tband, issued as HEP, see for details.  -MW      Time 5 3 min  -MW        User Key  (r) = Recorded By, (t) = Taken By, (c) = Cosigned By    Initials Name Provider Type    Deysi Isaac, PT Physical Therapist                            Therapy Education  Given: Posture/body mechanics, Mobility training, Symptoms/condition management  Program: Reinforced  How Provided: Verbal  Provided to: Patient  Level of Understanding: Verbalized, Teach back education performed              Time Calculation:   Start Time: 1345   Therapy Suggested Charges     Code   Minutes Charges    97755 (CPT®) Hc Pt Neuromusc Re Education Ea 15 Min 40 3    80853 (CPT®) Hc Pt Ther Proc Ea 15 Min 15 1    97331 (CPT®) Hc Gait Training Ea 15 Min      08675 (CPT®) Hc Pt Therapeutic Act Ea 15 Min      84190 (CPT®) Hc Pt Manual Therapy Ea 15 Min      24581 (CPT®) Hc Pt Ther Massage- Per 15 Min      43610 (CPT®)  Pt " Iontophoresis Ea 15 Min      55453 (CPT®) Hc Pt Elec Stim Ea-Per 15 Min      27191 (CPT®) Hc Pt Ultrasound Ea 15 Min      64349 (CPT®) Hc Pt Self Care/Mgmt/Train Ea 15 Min      92794 (CPT®) Hc Pt Prosthetic (S) Train Initial Encounter, Each 15 Min      27392 (CPT®) Hc Orthotic(S) Mgmt/Train Initial Encounter, Each 15min      63745 (CPT®) Hc Pt Aquatic Therapy Ea 15 Min      03512 (CPT®) Hc Pt Orthotic(S)/Prosthetic(S) Encounter, Each 15 Min      Total  55 4        Therapy Charges for Today     Code Description Service Date Service Provider Modifiers Qty    68116166801 HC PT NEUROMUSC RE EDUCATION EA 15 MIN 9/24/2018 Deysi Hatch, PT GP 3    86559863549 HC PT THER PROC EA 15 MIN 9/24/2018 Deysi Hatch, PT GP 1                    Deysi Hatch, PT  9/24/2018

## 2018-09-26 ENCOUNTER — HOSPITAL ENCOUNTER (OUTPATIENT)
Dept: OCCUPATIONAL THERAPY | Facility: HOSPITAL | Age: 83
Setting detail: THERAPIES SERIES
Discharge: HOME | End: 2018-09-26

## 2018-09-26 DIAGNOSIS — Z78.9 DECREASED INDEPENDENCE WITH ACTIVITIES OF DAILY LIVING: ICD-10-CM

## 2018-09-26 DIAGNOSIS — Z74.09 DECREASED FUNCTIONAL MOBILITY AND ENDURANCE: Primary | ICD-10-CM

## 2018-09-26 PROCEDURE — 97110 THERAPEUTIC EXERCISES: CPT | Performed by: OCCUPATIONAL THERAPIST

## 2018-09-26 PROCEDURE — 97530 THERAPEUTIC ACTIVITIES: CPT | Performed by: OCCUPATIONAL THERAPIST

## 2018-09-26 NOTE — THERAPY TREATMENT NOTE
"Outpatient Occupational Therapy Neuro Treatment Note  [facility]     Patient Name: Daly Gauthier  : 1935  MRN: [id number]  Today's Date: 2018       Visit Date: 2018    Patient Active Problem List   Diagnosis   • Mild cognitive impairment   • History of right breast cancer   • Cognitive impairment        Past Medical History:   Diagnosis Date   • Anxiety    • Depression    • Diabetes mellitus (CMS/HCC)    • Hyperlipidemia    • Hypertension         No past surgical history on file.      Visit Dx:    ICD-10-CM ICD-9-CM   1. Decreased functional mobility and endurance Z74.09 780.99   2. Decreased independence with activities of daily living Z65.8 V62.89                         OT Assessment/Plan     Row Name 18 1100          OT Assessment    Assessment Comments Pt demo fatigue this date and continues to c/o headaches and dizziness. Encouragement required for participation.   -EM        OT Plan    OT Plan Comments Continuer per POC  -EM       User Key  (r) = Recorded By, (t) = Taken By, (c) = Cosigned By    Initials Name Provider Type    Mary Jo Bashir, OTR Occupational Therapist              Therapy Education  Given: HEP  Program: Reinforced  How Provided: Verbal, Demonstration, Written  Provided to: Patient  Level of Understanding: Verbalized, Demonstrated              OT Exercises     Row Name 18 1100             Precautions    Existing Precautions/Restrictions no known precautions/restrictions  -EM         Subjective Comments    Subjective Comments Pt reports she wouldn't be here today if her  hadn't made her  -EM         Subjective Pain    Able to rate subjective pain? yes  -EM      Pre-Treatment Pain Level 8  -EM      Subjective Pain Comment Pt reports a \"bad, swimmy headache\"  -EM         Exercise 1    Exercise Name 1 energy and strength for ADL tasks  -EM      Time (Minutes) 1 10 mins on nu-step at level 6  -EM         Exercise 2    Exercise Name 2 Reviewed and " completed hand weight exercises using 2# hand weights - seated this date d/t pt reports of dizziness and not feeling well. See HEP for details.  -EM         Exercise 3    Exercise Name 3 Hand strengthening and coordination activity completed using firm putty and small pegs focusing on tip pinch, lateral pinch, in-hand manipulation, and translation. Increased time required for both hands.   -EM         Exercise 4    Exercise Name 4 hand and digit strengthening  -EM      Equipment 4 Hand Gripper  -EM      Resistance 4 Red;Green  -EM      Sets 4 5  -EM      Reps 4 10  -EM        User Key  (r) = Recorded By, (t) = Taken By, (c) = Cosigned By    Initials Name Provider Type    Mary Jo Bashir OTFADI Occupational Therapist                        Time Calculation:   OT Start Time: 1100     Therapy Suggested Charges     Code   Minutes Charges    53229 (CPT®) Hc Ot Neuromusc Re Education Ea 15 Min      66160 (CPT®) Hc Ot Ther Proc Ea 15 Min 40 3    40350 (CPT®) Hc Ot Therapeutic Act Ea 15 Min 13 1    34052 (CPT®) Hc Ot Manual Therapy Ea 15 Min      62644 (CPT®) Hc Ot Iontophoresis Ea 15 Min      58638 (CPT®) Hc Ot Elec Stim Ea-Per 15 Min      86952 (CPT®) Hc Ot Ultrasound Ea 15 Min      75286 (CPT®) Hc Ot Self Care/Mgmt/Train Ea 15 Min      Total  53 4          Therapy Charges for Today     Code Description Service Date Service Provider Modifiers Qty    52579409935 HC OT THER PROC EA 15 MIN 9/26/2018 Mary Jo Hurd OTR GO 3    26459989390 HC OT THERAPEUTIC ACT EA 15 MIN 9/26/2018 Mary Jo Hurd OTR GO 1                    DEB Delgadillo  9/26/2018

## 2018-10-01 ENCOUNTER — TELEPHONE (OUTPATIENT)
Dept: NEUROLOGY | Facility: CLINIC | Age: 83
End: 2018-10-01

## 2018-10-01 NOTE — TELEPHONE ENCOUNTER
Alicia left a voicemail stating that her mother had an incident last week and that Alicia Gomez had suggested she discuss this with Shante Lopez's office and possibly get in to see her earlier if possible.

## 2018-10-01 NOTE — TELEPHONE ENCOUNTER
"Spoke with Alicia regarding her mother's incident. Alicia states the Friday before last, they were celebrating her dad's birthday and her parents had somewhat of a busy day, went up to [place] to make final payments on their preplanned grave sights, went out to eat at a restaurant and then met the kids back at the house for cake and ice cream. States they had been playing around and put all 82 of dad's candles on the cake which took all 5 of them to light and her brother was ready with a fire extinguisher. Daly had been acting completely normal up until the point where it was time for him to blow out the candles. She got up from the couch where she had been sitting most of the evening and approached the table with a totally blank stare on her face. When asked if she was ok, she stated \"no\". It seemed all of a sudden her legs became very weak and shaky and although she did not pass out, both Alicia and her father grabbed an arm and basically hoisted her up and got her back to the couch where she continued to just lay there, very disoriented and although eyes open, it was as if she had completely checked out for a good 20 minutes, answering the occasional yes or no question but nothing more. States her mother has no memory of this happening whatsoever, but they saw Alicia Gomez with [facility] on Tuesday this past week where she was complaining again about having a HA. Nothing seems to make it go away.     Since her spell she is very agitated, short and kind of hateful with everyone. Not willing to do much at all either. LAURA Gomez had mentioned getting in contact with us and possibly seeing Shante Lopez earlier or if she had any suggestions for what may be going on with Daly and what she would reccomend?   "

## 2018-10-01 NOTE — TELEPHONE ENCOUNTER
I would recommend that she get an earlier appointment with Dr. Kennedy or myself if possible for an evaluation. Thanks

## 2018-10-01 NOTE — TELEPHONE ENCOUNTER
PT's daughter, Alicia, called to check on the status of her message that Maritza ZAMORA was relaying to Shante. She says she'd like to speak with someone about her mother before she leaves for the day, as she's spending time with her father today and would like him to be involved/recieve some answers as well.     Please call Alicia back: [phone]

## 2018-10-01 NOTE — TELEPHONE ENCOUNTER
Spoke with Alicia regarding getting her mother in for an earlier appointment for evaluation. Earliest appointment is on October the 9th @ 8:30am. Alicia stated this will work fine and appreciates Shante fitting her in to be seen!

## 2018-10-09 ENCOUNTER — OFFICE VISIT (OUTPATIENT)
Dept: NEUROLOGY | Facility: CLINIC | Age: 83
End: 2018-10-09

## 2018-10-09 ENCOUNTER — APPOINTMENT (OUTPATIENT)
Dept: OCCUPATIONAL THERAPY | Facility: HOSPITAL | Age: 83
End: 2018-10-09

## 2018-10-09 ENCOUNTER — HOSPITAL ENCOUNTER (OUTPATIENT)
Dept: PHYSICAL THERAPY | Facility: HOSPITAL | Age: 83
Setting detail: THERAPIES SERIES
Discharge: HOME | End: 2018-10-09

## 2018-10-09 VITALS
DIASTOLIC BLOOD PRESSURE: 82 MMHG | BODY MASS INDEX: 30.12 KG/M2 | SYSTOLIC BLOOD PRESSURE: 120 MMHG | WEIGHT: 170 LBS | HEIGHT: 63 IN

## 2018-10-09 DIAGNOSIS — R26.9 GAIT ABNORMALITY: Primary | ICD-10-CM

## 2018-10-09 DIAGNOSIS — R26.89 BALANCE PROBLEM: ICD-10-CM

## 2018-10-09 DIAGNOSIS — R41.89 COGNITIVE IMPAIRMENT: Primary | ICD-10-CM

## 2018-10-09 DIAGNOSIS — R68.89 EPISODES OF DECREASED ATTENTIVENESS: ICD-10-CM

## 2018-10-09 PROCEDURE — 99215 OFFICE O/P EST HI 40 MIN: CPT | Performed by: PHYSICIAN ASSISTANT

## 2018-10-09 PROCEDURE — G8979 MOBILITY GOAL STATUS: HCPCS | Performed by: PHYSICAL THERAPIST

## 2018-10-09 PROCEDURE — 97112 NEUROMUSCULAR REEDUCATION: CPT | Performed by: PHYSICAL THERAPIST

## 2018-10-09 PROCEDURE — G8978 MOBILITY CURRENT STATUS: HCPCS | Performed by: PHYSICAL THERAPIST

## 2018-10-09 PROCEDURE — 97110 THERAPEUTIC EXERCISES: CPT | Performed by: PHYSICAL THERAPIST

## 2018-10-09 RX ORDER — BLOOD SUGAR DIAGNOSTIC
STRIP MISCELLANEOUS
Refills: 0 | COMMUNITY
Start: 2018-10-07 | End: 2020-10-07

## 2018-10-09 RX ORDER — AMLODIPINE BESYLATE 5 MG/1
TABLET ORAL
Refills: 0 | COMMUNITY
Start: 2018-09-09 | End: 2021-10-22

## 2018-10-09 NOTE — THERAPY PROGRESS REPORT/RE-CERT
"    .Outpatient Physical Therapy Neuro Re-Assessment  [facility]     Patient Name: Daly Gauthier  : 1935  MRN: [id number]  Today's Date: 10/9/2018      Visit Date: 10/09/2018    Patient Active Problem List   Diagnosis   • Mild cognitive impairment   • History of right breast cancer   • Cognitive impairment        Past Medical History:   Diagnosis Date   • Anxiety    • Depression    • Diabetes mellitus (CMS/HCC)    • Hyperlipidemia    • Hypertension         No past surgical history on file.      Visit Dx:     ICD-10-CM ICD-9-CM   1. Gait abnormality R26.9 781.2   2. Balance problem R26.89 781.99                     PT Neuro     Row Name 10/09/18 1400             Subjective Comments    Subjective Comments \"I'm dizzy today. I wake up like this sometimes.\"  -MW         Subjective Pain    Able to rate subjective pain? yes  -MW      Pre-Treatment Pain Level 0  -MW      Post-Treatment Pain Level 0  -MW         Balance Skills Training    Training Strategies (Balance) Re-assessment completed, see for details.  St balance with B fwd step up onto/off 6\" step without UE A x 10 with min A, B fwd step to BOSU and hold with toss/catch ball on/off rebounder x 10 with min/mod A and LOB 40% of the time.  B fwd lunge and side lunge over half foam roll with B UE A on TRX into B shoulder abduction with min A for balance.  Sitting balance with attempted B LE marching with min A with pt demonstrating difficutly to lift B LEs  -MW        User Key  (r) = Recorded By, (t) = Taken By, (c) = Cosigned By    Initials Name Provider Type    Deysi Isaac, PT Physical Therapist                  Therapy Education  Given: Symptoms/condition management, Posture/body mechanics, Mobility training  Program: Reinforced  How Provided: Verbal  Provided to: Patient  Level of Understanding: Verbalized          PT OP Goals     Row Name 10/09/18 1400          PT Short Term Goals    STG Date to Achieve 10/15/18  -MW     STG 1 Patient to " improve GAMBLE balance score to >/= 40/56 to decrease client's risk of falls.  -     STG 1 Progress Met  -MW     STG 2 Patient to perform TUG within 13 sec without LOB for improved functional mobility.  -MW     STG 2 Progress Ongoing  -MW     STG 3 Patient to improve FGA score to >/= 15/30 to decrease client's risk of falls.  -MW     STG 3 Progress Ongoing  -MW     STG 4 Patient to ambulate 10 meters without AD within 12 sec without LOB for improved gait riley and functional mobility.  -MW     STG 4 Progress Ongoing  -MW        Long Term Goals    LTG Date to Achieve 11/19/18  -MW     LTG 1 Patient to improve GAMBLE balance score to >/= 48/56 to decrease client's risk of falls.  -MW     LTG 1 Progress Ongoing  -MW     LTG 2 Patient to perform TUG within 11 sec without LOB for improved functional mobility.  -MW     LTG 2 Progress Ongoing  -MW     LTG 3 Patient to improve FGA score to >/= 22/30 to decrease client's risk of falls.  -MW     LTG 3 Progress Ongoing  -MW     LTG 4 Patient to ambulate 10 meters without AD within 10 sec without LOB for improved gait riley and functional mobility.  -MW     LTG 4 Progress Ongoing  -     LTG 5 Pt. to perform 8 sit to stands within 30 seconds without UE A for improved functional mobility.    -     LTG 5 Progress Ongoing  -     LTG 6 Patient to be I with HEP.  -     LTG 6 Progress Ongoing  -        Time Calculation    PT Goal Re-Cert Due Date 12/09/18  -       User Key  (r) = Recorded By, (t) = Taken By, (c) = Cosigned By    Initials Name Provider Type    Deysi Isaac, PT Physical Therapist                PT Assessment/Plan     Row Name 10/09/18 1400          PT Assessment    Functional Limitations Impaired gait;Impaired locomotion;Limitations in community activities;Performance in self-care ADL  -     Impairments Balance;Cognition;Coordination;Endurance;Gait;Impaired flexibility;Locomotion;Muscle strength;Posture  -     Assessment Comments Pt. met STG  "for GAMBLE today and is making progress with balance.  Pt. did not demonstrate improved gait riley.  Pt. requires min/mod A with standing dynamic balance activities.  Pt. has fear of falling with activities but is able to perform with VCing and assist.  Pt. has difficulty with moving outside TWIN.  Pt. to benefit from continued therapy services.  -MW     Please refer to paper survey for additional self-reported information Yes  -MW     Rehab Potential Good  -MW     Patient/caregiver participated in establishment of treatment plan and goals Yes  -MW     Patient would benefit from skilled therapy intervention Yes  -MW        PT Plan    PT Frequency 1x/week  -MW     Predicted Duration of Therapy Intervention (Therapy Eval) 6 visits  -MW     Planned CPT's? PT THER PROC EA 15 MIN: 18628;PT THER ACT EA 15 MIN: 99985;PT NEUROMUSC RE-EDUCATION EA 15 MIN: 11929;PT GAIT TRAINING EA 15 MIN: 15934  -MW     PT Plan Comments Continue with PT services to improve gait, balance, strength and overall functional mobility.  -MW       User Key  (r) = Recorded By, (t) = Taken By, (c) = Cosigned By    Initials Name Provider Type    Deysi Isaac, PT Physical Therapist                   Exercises     Row Name 10/09/18 1400             Subjective Comments    Subjective Comments \"I'm dizzy today. I wake up like this sometimes.\"  -MW         Subjective Pain    Able to rate subjective pain? yes  -MW      Pre-Treatment Pain Level 0  -MW      Post-Treatment Pain Level 0  -MW         Total Minutes    97103 - PT Therapeutic Exercise Minutes 13  -MW      14536 -  PT Neuromuscular Reeducation Minutes 42  -MW         Exercise 1    Exercise Name 1 NuStep L6  -MW      Time 1 13 min  -MW        User Key  (r) = Recorded By, (t) = Taken By, (c) = Cosigned By    Initials Name Provider Type    Deysi Isaac, PT Physical Therapist                      Outcome Measure Options: 10 Meter Walk, Gamble Balance, Timed Up and Go (TUG), 30 Second Chair " Stand Test  10 Meter Walk Test Self-Selected Velocity  Self-Selected Velocity: Trial 1: 13.99 sec.  10 Meter Walk Test Fast Velocity  10 Meter Walk Fast Velocity: Trial 1: 11.87 sec.  30 Second Chair Stand Test  30 Second Chair Stand Test: 5  Duval Balance Scale  Sitting to Standing: able to stand without using hands and stabilize independently  Standing Unsupported: able to stand safely for 2 minutes  Sitting with Back Unsupported but Feet Supported on Floor or on Stool: able to sit safely and securely for 2 minutes  Standing to Sitting: controls descent by using hands  Transfers: able to transfer safely definite need of hands  Standing Unsupported with Eyes Closed: able to stand 10 seconds safely  Standing Unsupported with Feet Together: able to place feet together independently and stand 1 minute safely  Reaching Forward with Outstretched Arm While Standing: can reach forward confidently 25 cm (10 inches)   Object From the Floor From a Standing Position: able to  object safely and easily  Turning to Look Behind Over Left and Right Shoulders While Standing: looks behind one side only other side shows less weight shift  Turn 360 Degrees: able to turn 360 degrees safely but slowly  Place Alternate Foot on Step or Stool While Standing Unsupported: able to complete 4 steps without aid with supervision  Standing Unsupported with One Foot in Front: able to place foot ahead independently and hold 30 seconds  Standing on One Leg: able to lift leg independently and hold >/equal to 3 seconds  Duval Total Score: 46  Timed Up and Go (TUG)  TUG Test 1: 14.76 seconds    Time Calculation:   Start Time: 1400   Therapy Suggested Charges     Code   Minutes Charges    92967 (CPT®) Hc Pt Neuromusc Re Education Ea 15 Min 42 3    43286 (CPT®) Hc Pt Ther Proc Ea 15 Min 13 1    40366 (CPT®) Hc Gait Training Ea 15 Min      18423 (CPT®) Hc Pt Therapeutic Act Ea 15 Min      46571 (CPT®) Hc Pt Manual Therapy Ea 15 Min      20077  (CPT®) Hc Pt Ther Massage- Per 15 Min      54860 (CPT®) Hc Pt Iontophoresis Ea 15 Min      35078 (CPT®) Hc Pt Elec Stim Ea-Per 15 Min      34674 (CPT®) Hc Pt Ultrasound Ea 15 Min      38677 (CPT®) Hc Pt Self Care/Mgmt/Train Ea 15 Min      81883 (CPT®) Hc Pt Prosthetic (S) Train Initial Encounter, Each 15 Min      74978 (CPT®) Hc Orthotic(S) Mgmt/Train Initial Encounter, Each 15min      03825 (CPT®) Hc Pt Aquatic Therapy Ea 15 Min      38746 (CPT®) Hc Pt Orthotic(S)/Prosthetic(S) Encounter, Each 15 Min       (CPT®) Hc Pt Electrical Stim Unattended      Total  55 4        Therapy Charges for Today     Code Description Service Date Service Provider Modifiers Qty    76686602132 HC PT MOBILITY CURRENT 10/9/2018 Deysi Hatch, PT GP, CL 1    96111031512 HC PT MOBILITY PROJECTED 10/9/2018 Deysi Hatch, PT GP, CJ 1    17406795896 HC PT THER PROC EA 15 MIN 10/9/2018 Deysi Hatch, PT GP 1    91094364527 HC PT NEUROMUSC RE EDUCATION EA 15 MIN 10/9/2018 Deysi Hatch, PT GP 3          PT G-Codes  PT Professional Judgement Used?: Yes  Outcome Measure Options: 10 Meter Walk, Duval Balance, Timed Up and Go (TUG), 30 Second Chair Stand Test  Duval Total Score: 46  TUG Test 1: 14.76 seconds  Functional Limitation: Mobility: Walking and moving around  Mobility: Walking and Moving Around Current Status (): At least 60 percent but less than 80 percent impaired, limited or restricted  Mobility: Walking and Moving Around Goal Status (): At least 20 percent but less than 40 percent impaired, limited or restricted         Deysi Hatch, PT  10/9/2018

## 2018-10-09 NOTE — PROGRESS NOTES
"Subjective     Chief Complaint: memory loss      History of Present Illness   Daly Gauthier is a 83 y.o. female who returns to clinic today with a difficult history of cognitive impairment. She and her family have noted a progressive cognitive decline since 2011 when she underwent chemotherapy and radiation for breast cancer. This has been marked over time by impairments in memory, orientation, language and executive function. There have also been associated symptoms of depression and apathy.      An MRI and screening bloodwork have been unrevealing. She has been treated with donepezil, and has been intolerant of Namenda due to dizziness. She saw Dr. Urrutia at  in March, 2017 and was diagnosed with vascular dementia by report.     Today: Since her last visit in 7/18, she feels essentially unchanged cognitively and her family agrees. She and her family continue to note depression and apathy. It is noted that she had a spell in 9/18 during which her family states that she seemed \"out of it\". This lasted for approximately 20 minutes. During the episode, her family notes that she stared ahead blankly. She is amnestic of the even, though her family notes that she was able to answer some questions during this period of time. Her family notes associated fatigue and confusion following the spell. Her family denies associated convulsions, urinary incontinence, or tongue biting. She has had 5-6 similar spells over the last 5-6 years. An EEG in 11/17 was unremarkable.       I have reviewed and confirmed the past family, social and medical history as accurate on 10/9/18.     Review of Systems   Constitutional: Negative.    HENT: Negative.    Eyes: Negative.    Respiratory: Negative.    Cardiovascular: Negative.    Gastrointestinal: Negative.    Endocrine: Negative.    Genitourinary: Negative.    Musculoskeletal: Negative.    Skin: Negative.    Allergic/Immunologic: Negative.    Neurological:        Memory loss   " "  Hematological: Negative.    Psychiatric/Behavioral: Positive for dysphoric mood.       Objective     /82   Ht 160 cm (62.99\")   Wt 77.1 kg (170 lb)   BMI 30.12 kg/m²     General appearance today is normal.         Physical Exam   Neurological: She has normal strength. She has a normal Finger-Nose-Finger Test and a normal Romberg Test.   Psychiatric: Her speech is normal.        Neurologic Exam     Mental Status   Oriented to person.   Oriented to place.   Oriented to time. Disoriented to year and date. Oriented to month, day and season.   Registration: recalls 3 of 3 objects. Recall at 5 minutes: recalls 2 of 3 objects. Follows 3 step commands.   Attention: 4/5 sequencing.   Speech: speech is normal   Level of consciousness: alert  Able to name object. Able to read. Able to repeat. Able to write. Normal comprehension.     Cranial Nerves   Cranial nerves II through XII intact.     Motor Exam   Muscle bulk: normal  Overall muscle tone: normal    Strength   Strength 5/5 throughout.     Sensory Exam   Light touch normal.     Gait, Coordination, and Reflexes     Gait  Gait: (mildly decreased arm swing and turns en bloc)    Coordination   Romberg: negative  Finger to nose coordination: normal    Tremor   Resting tremor: absent        Results  MMSE=24 (22 in 7/18)       Assessment/Plan   Daly was seen today for memory loss.    Diagnoses and all orders for this visit:    Cognitive impairment    Episodes of decreased attentiveness  -     EEG Awake or Asleep Routine          Discussion/Summary   Daly Gauthier returns to clinic today with a history of cognitive impairment. While a vascular dementia as proposed by Dr. Urrutia at  is possible, I continue to be concerned about the contribution of depression, which I again discussed today. She may have some degree of parkinsonism, which could be related to vascular disease and/or Abilify, though we are not convinced this is a major contributor to her current " status. As for her spell of decreased responsiveness, the etiology is unclear. It was elected to repeat an EEG to rule out potential seizure activity. After discussing potential treatment options, it was elected to continue on  donepezil unchanged. She will continue to work closely with PT and OT. She will then follow up in 1 month, or sooner if needed.   I spent 40 minutes minutes face to face with the patient and family with 30 minutes spent on discussing diagnosis, prognosis, diagnostic testing, evaluation, current status, treatment options and management as discussed above.       As part of this visit I obtained additional history from the family which is incorporated in the HPI.      Shante Lopez PA-C

## 2018-10-10 ENCOUNTER — APPOINTMENT (OUTPATIENT)
Dept: OCCUPATIONAL THERAPY | Facility: HOSPITAL | Age: 83
End: 2018-10-10

## 2018-10-12 ENCOUNTER — HOSPITAL ENCOUNTER (OUTPATIENT)
Dept: OCCUPATIONAL THERAPY | Facility: HOSPITAL | Age: 83
Setting detail: THERAPIES SERIES
Discharge: HOME | End: 2018-10-12

## 2018-10-12 DIAGNOSIS — Z78.9 DECREASED INDEPENDENCE WITH ACTIVITIES OF DAILY LIVING: ICD-10-CM

## 2018-10-12 DIAGNOSIS — Z74.09 DECREASED FUNCTIONAL MOBILITY AND ENDURANCE: Primary | ICD-10-CM

## 2018-10-12 PROCEDURE — G8984 CARRY CURRENT STATUS: HCPCS | Performed by: OCCUPATIONAL THERAPIST

## 2018-10-12 PROCEDURE — 97530 THERAPEUTIC ACTIVITIES: CPT | Performed by: OCCUPATIONAL THERAPIST

## 2018-10-12 PROCEDURE — G8985 CARRY GOAL STATUS: HCPCS | Performed by: OCCUPATIONAL THERAPIST

## 2018-10-12 PROCEDURE — G8986 CARRY D/C STATUS: HCPCS | Performed by: OCCUPATIONAL THERAPIST

## 2018-10-12 NOTE — THERAPY DISCHARGE NOTE
Outpatient Occupational Therapy Neuro Progress Note/Discharge Summary  [facility]     Patient Name: Daly Gauthier  : 1935  MRN: [id number]  Today's Date: 10/12/2018      Visit Date: 10/12/2018    Patient Active Problem List   Diagnosis   • Mild cognitive impairment   • History of right breast cancer   • Cognitive impairment        Past Medical History:   Diagnosis Date   • Anxiety    • Depression    • Diabetes mellitus (CMS/HCC)    • Hyperlipidemia    • Hypertension         No past surgical history on file.      Visit Dx:    ICD-10-CM ICD-9-CM   1. Decreased functional mobility and endurance Z74.09 780.99   2. Decreased independence with activities of daily living Z65.8 V62.89             OT Neuro     Row Name 10/12/18 1015             Subjective Comments    Subjective Comments Pt reports she feels like she would be normal if it weren't for the headaches and dizziness.  -EM         Precautions and Contraindications    Precautions/Limitations fall precautions  -EM         Subjective Pain    Able to rate subjective pain? yes  -EM      Pre-Treatment Pain Level 7  -EM      Post-Treatment Pain Level 7  -EM         Sensation    Additional Comments no changes since initial eval  -EM         Coordination    9-Hole Peg Left 38.90  -EM      9-Hole Peg Right 28.15  -EM         General ROM    GENERAL ROM COMMENTS BUE WFL - see PT for LE ROM  -EM         Upper Extremity (Manual Muscle Testing)    Comment, MMT: Upper Extremity gross MMT B UEs 4/5  -EM         ADL Assessment/Intervention    Comment, IADL Assessment/Training Pt reports some buttons have gotten easier and others continue to be difficult.  and patient share jobs around the house - including cooking and cleaning.   -EM        User Key  (r) = Recorded By, (t) = Taken By, (c) = Cosigned By    Initials Name Provider Type    Mary Jo Bashir, OTR Occupational Therapist             Hand Therapy (last 24 hours)      Hand Eval     Row Name 10/12/18 1015               Strength Right    # Reps 3  -EM      Right Rung 2  -EM      Right  Test 1 30  -EM      Right  Test 2 31  -EM      Right  Test 3 31  -EM       Strength Average Right 30.67  -EM          Strength Left    # Reps 3  -EM      Left Rung 2  -EM      Left  Test 1 30  -EM      Left  Test 2 30  -EM      Left  Test 3 31  -EM       Strength Average Left 30.33  -EM         Right Hand Strength - Pinch (lbs)    Lateral 7 lbs  -EM         Left Hand Strength - Pinch (lbs)    Lateral 6 lbs  -EM        User Key  (r) = Recorded By, (t) = Taken By, (c) = Cosigned By    Initials Name Provider Type    ARIANE Mary Jo, OTR Occupational Therapist                    OT Assessment/Plan     Row Name 10/12/18 1015          OT Assessment    Assessment Comments Pt demo limited changes this reassessment compared to last measurements.  Her  continues to assist patient in completing her HEPs and both demo good understanding of the importance of continuing w/ HEPs and considered returning to water aerobics or finding another program.   -EM        OT Plan    OT Plan Comments Discharge w/ HEPs.   -EM       User Key  (r) = Recorded By, (t) = Taken By, (c) = Cosigned By    Initials Name Provider Type    ARIANE HurdMary Jo, OTR Occupational Therapist                 OT Goals     Row Name 10/12/18 1015          OT Short Term Goals    STG Date to Achieve 10/17/18  -EM     STG 1 Pt and  will be educated in HEP to increase UB and hand strength for increased independence with ADLs  -EM     STG 1 Progress Met  -EM     STG 2 Patient will complete dynamic standing balance activities while incorporating B UEs into tasks with min A   -EM     STG 2 Progress Met  -EM     STG 3 Patient will complete handwriting and coordination HEP to increase independence with writing cards/letters to family members as well as other FMC throughout daily routine  -EM     STG 3 Progress Met  -EM        Long Term Goals     LTG Date to Achieve 11/14/18  -EM     LTG 1 Pt and  will be independent with HEPs using written instructions  -EM     LTG 1 Progress Met  -EM     LTG 2 Patient will complete dynamic standing balance activities while incorporating B UEs into tasks with SBA  -EM     LTG 2 Progress Not Met  -EM     LTG 2 Progress Comments fluctuates  -EM     LTG 3 Patient will score < 40 secs on 9 HPT bilaterally to indicate increased independence with ADL tasks.   -EM     LTG 3 Progress Met  -EM     LTG 4 Pt will score < 34 secs on 9 HPT with R hand to indicate increased coordination for ADL tasks  -EM     LTG 4 Progress Not Met  -EM       User Key  (r) = Recorded By, (t) = Taken By, (c) = Cosigned By    Initials Name Provider Type    Mary Jo Bashir, OTR Occupational Therapist          Therapy Education  Education Details: discussed returning to water aerobics or finding a program/gym  Given: HEP  Program: Reinforced  How Provided: Verbal  Provided to: Patient, Caregiver  Level of Understanding: Verbalized, Demonstrated                9 Hole Peg  9-Hole Peg Left: 38.90  9-Hole Peg Right: 28.15  How much help from another is currently needed...  Putting on and taking off regular lower body clothing?: none  Bathing (including washing, rinsing, and drying): a little  Toileting (which includes using toilet bed pan or urinal): none  Putting on and taking off regular upper body clothing: a little  Taking care of personal grooming (such as brushing teeth): none  Eating meals: none  Score: 22         Time Calculation:   OT Start Time: 1015     Therapy Suggested Charges     Code   Minutes Charges    01026 (CPT®) Hc Ot Neuromusc Re Education Ea 15 Min      79884 (CPT®) Hc Ot Ther Proc Ea 15 Min      27762 (CPT®) Hc Ot Therapeutic Act Ea 15 Min 40 3    18820 (CPT®) Hc Ot Manual Therapy Ea 15 Min      82174 (CPT®) Hc Ot Iontophoresis Ea 15 Min      28061 (CPT®) Hc Ot Elec Stim Ea-Per 15 Min      06770 (CPT®) Hc Ot Ultrasound Ea 15 Min       40241 (CPT®) Hc Ot Self Care/Mgmt/Train Ea 15 Min       (CPT®) Hc Ot Electrical Stim Unattended      Total  40 3          Therapy Charges for Today     Code Description Service Date Service Provider Modifiers Qty    39773385597 HC OT CARRY MOV HAND OBJ CURRENT 10/12/2018 MondaneMary Jo OTR GO, CK 1    85798924100 HC OT CARRY MOV HAND OBJ PROJECTED 10/12/2018  Mary Jo DEB ALMONTE, CJ 1    54705849249 HC OT CARRY MOV HAND OBJ DISCHARGE 10/12/2018  Mary Joarturo ALMONTE OTR GO, CJ 1    38562894759 HC OT THERAPEUTIC ACT EA 15 MIN 10/12/2018  Mary Jo ALMONTE OTFADI GO 3          OT G-codes  OT Professional Judgement Used?: Yes  OT Functional Scales Options: AM-PAC 6 Clicks Daily Activity (OT), 9 Hole Peg  Functional Limitation: Carrying, moving and handling objects  Carrying, Moving and Handling Objects Current Status (): At least 40 percent but less than 60 percent impaired, limited or restricted  Carrying, Moving and Handling Objects Goal Status (): At least 20 percent but less than 40 percent impaired, limited or restricted  Carrying, Moving and Handling Objects Discharge Status (): At least 20 percent but less than 40 percent impaired, limited or restricted              Mary Jo ALMONTEDEB Hernandez  10/12/2018

## 2018-10-19 ENCOUNTER — APPOINTMENT (OUTPATIENT)
Dept: OCCUPATIONAL THERAPY | Facility: HOSPITAL | Age: 83
End: 2018-10-19

## 2018-10-23 ENCOUNTER — HOSPITAL ENCOUNTER (OUTPATIENT)
Dept: PHYSICAL THERAPY | Facility: HOSPITAL | Age: 83
Setting detail: THERAPIES SERIES
Discharge: HOME | End: 2018-10-23

## 2018-10-23 DIAGNOSIS — R26.9 GAIT ABNORMALITY: Primary | ICD-10-CM

## 2018-10-23 DIAGNOSIS — R26.89 BALANCE PROBLEM: ICD-10-CM

## 2018-10-23 PROCEDURE — 97110 THERAPEUTIC EXERCISES: CPT | Performed by: PHYSICAL THERAPIST

## 2018-10-23 PROCEDURE — 97112 NEUROMUSCULAR REEDUCATION: CPT | Performed by: PHYSICAL THERAPIST

## 2018-10-23 NOTE — THERAPY TREATMENT NOTE
"    Outpatient Physical Therapy Neuro Treatment Note  [facility]     Patient Name: Daly Gauthier  : 1935  MRN: [id number]  Today's Date: 10/23/2018      Visit Date: 10/23/2018    Visit Dx:    ICD-10-CM ICD-9-CM   1. Gait abnormality R26.9 781.2   2. Balance problem R26.89 781.99       Patient Active Problem List   Diagnosis   • Mild cognitive impairment   • History of right breast cancer   • Cognitive impairment                 PT Neuro     Row Name 10/23/18 1100             Subjective Comments    Subjective Comments \"I haven't been feeling well.  I've been to doctors and they don't know what it is.  I have diahrrea and feel like I'm going to spit up.\"  -MW         Subjective Pain    Able to rate subjective pain? yes  -MW      Pre-Treatment Pain Level 0  -MW      Post-Treatment Pain Level 0  -MW         Balance Skills Training    Training Strategies (Balance) St. balance without UE A and stepping up onto/off 8\" step x 10, one foot on foam block and other foot on step and hold with B shoulder flexion overhead x 5 with min/mod A.  St. on blue foam with reg TWIN and B staggered with toss/catch ball on/off rebounder x 10 with min A and then singel LE step up onto/off blue foam with toss/catch ball on/off rebounder x 5 with min A with VCing to perform increased step length bwds.    -        User Key  (r) = Recorded By, (t) = Taken By, (c) = Cosigned By    Initials Name Provider Type    Deysi Isaac, PT Physical Therapist                        PT Assessment/Plan     Row Name 10/23/18 1100          PT Assessment    Assessment Comments Pt. was not feeling well during tx session and required several seated rest breaks.  Pt.reports being \"dizzy\" but is able to continue.  Pt. to benefit from continued therapy services to meet remaining goals.  -        PT Plan    PT Plan Comments Continue with PT services to improve gait, balance, strength, transfers and overall functional mobility.  -       User Key  " "(r) = Recorded By, (t) = Taken By, (c) = Cosigned By    Initials Name Provider Type    Deysi Isaac, PT Physical Therapist                     Exercises     Row Name 10/23/18 1100             Subjective Comments    Subjective Comments \"I haven't been feeling well.  I've been to doctors and they don't know what it is.  I have diahrrea and feel like I'm going to spit up.\"  -MW         Subjective Pain    Able to rate subjective pain? yes  -MW      Pre-Treatment Pain Level 0  -MW      Post-Treatment Pain Level 0  -MW         Total Minutes    40228 - PT Therapeutic Exercise Minutes 13  -MW      33024 -  PT Neuromuscular Reeducation Minutes 30  -MW         Exercise 1    Exercise Name 1 NuStep L6  -MW      Time 1 13 min  -MW        User Key  (r) = Recorded By, (t) = Taken By, (c) = Cosigned By    Initials Name Provider Type    Deysi Isaac, PT Physical Therapist                            Therapy Education  Given: Symptoms/condition management, Posture/body mechanics, Mobility training  Program: Reinforced  How Provided: Verbal  Provided to: Patient  Level of Understanding: Verbalized              Time Calculation:   Start Time: 1100   Therapy Suggested Charges     Code   Minutes Charges    24895 (CPT®) Hc Pt Neuromusc Re Education Ea 15 Min 30 2    54877 (CPT®) Hc Pt Ther Proc Ea 15 Min 13 1    88746 (CPT®) Hc Gait Training Ea 15 Min      62559 (CPT®) Hc Pt Therapeutic Act Ea 15 Min      70961 (CPT®) Hc Pt Manual Therapy Ea 15 Min      28604 (CPT®) Hc Pt Ther Massage- Per 15 Min      45309 (CPT®) Hc Pt Iontophoresis Ea 15 Min      72028 (CPT®) Hc Pt Elec Stim Ea-Per 15 Min      79561 (CPT®) Hc Pt Ultrasound Ea 15 Min      07665 (CPT®) Hc Pt Self Care/Mgmt/Train Ea 15 Min      67853 (CPT®) Hc Pt Prosthetic (S) Train Initial Encounter, Each 15 Min      88593 (CPT®) Hc Orthotic(S) Mgmt/Train Initial Encounter, Each 15min      95639 (CPT®) Hc Pt Aquatic Therapy Ea 15 Min      66741 (CPT®) Hc Pt " Orthotic(S)/Prosthetic(S) Encounter, Each 15 Min       (CPT®) Hc Pt Electrical Stim Unattended      Total  43 3        Therapy Charges for Today     Code Description Service Date Service Provider Modifiers Qty    14425250779 HC PT NEUROMUSC RE EDUCATION EA 15 MIN 10/23/2018 Deysi Hatch, PT GP 2    80819118540 HC PT THER PROC EA 15 MIN 10/23/2018 Deysi Hatch, PT GP 1                    Deysi Hatch, PT  10/23/2018

## 2018-10-26 ENCOUNTER — APPOINTMENT (OUTPATIENT)
Dept: OCCUPATIONAL THERAPY | Facility: HOSPITAL | Age: 83
End: 2018-10-26

## 2018-10-29 ENCOUNTER — HOSPITAL ENCOUNTER (OUTPATIENT)
Dept: NEUROLOGY | Facility: HOSPITAL | Age: 83
Discharge: HOME | End: 2018-10-29
Admitting: PHYSICIAN ASSISTANT

## 2018-10-29 PROCEDURE — 95816 EEG AWAKE AND DROWSY: CPT

## 2018-10-30 ENCOUNTER — HOSPITAL ENCOUNTER (OUTPATIENT)
Dept: PHYSICAL THERAPY | Facility: HOSPITAL | Age: 83
Setting detail: THERAPIES SERIES
Discharge: HOME | End: 2018-10-30

## 2018-10-30 DIAGNOSIS — R26.9 GAIT ABNORMALITY: Primary | ICD-10-CM

## 2018-10-30 DIAGNOSIS — R26.89 BALANCE PROBLEM: ICD-10-CM

## 2018-10-30 PROCEDURE — 97112 NEUROMUSCULAR REEDUCATION: CPT | Performed by: PHYSICAL THERAPIST

## 2018-10-30 PROCEDURE — 97116 GAIT TRAINING THERAPY: CPT | Performed by: PHYSICAL THERAPIST

## 2018-10-30 PROCEDURE — 97110 THERAPEUTIC EXERCISES: CPT | Performed by: PHYSICAL THERAPIST

## 2018-10-30 NOTE — THERAPY TREATMENT NOTE
"    Outpatient Physical Therapy Neuro Treatment Note  [facility]     Patient Name: Daly Gauthier  : 1935  MRN: [id number]  Today's Date: 10/30/2018      Visit Date: 10/30/2018    Visit Dx:    ICD-10-CM ICD-9-CM   1. Gait abnormality R26.9 781.2   2. Balance problem R26.89 781.99       Patient Active Problem List   Diagnosis   • Mild cognitive impairment   • History of right breast cancer   • Cognitive impairment                 PT Neuro     Row Name 10/30/18 1100             Subjective Comments    Subjective Comments \"I still don't feel well to my stomach.  The doctors don't know what's wrong.\"  -MW         Subjective Pain    Able to rate subjective pain? yes  -MW      Pre-Treatment Pain Level 0  -MW      Post-Treatment Pain Level 0  -MW         Gait/Stairs Assessment/Training    30947 - Gait Training Minutes  10  -MW      Comment (Gait/Stairs) Ambulation fwd/bwd with min A and VCing to increased B arm swing and increase B step length.  Added cervical motion with gait with pt dem decreased riley.  -MW         Balance Skills Training    17998 -  PT Neuromuscular Reeducation Minutes 35  -MW      Training Strategies (Balance) Standing balance with tandem walking fwd along 8 ft balance beam x 4 with min A and then hold tandem with LE behind stepping fwd and back x 10 with min A for balance.Standing balance with B UE A on TRX with B alternating step to BOSU x 10; B fwd stepping up onto BOSU with min/mod A and hold with standing on top of rounded BOSU with mod/max A to keep balance.  B UE a on TRX with B side step over half foam roll with B shoulder abduction x 10 with min A for balance.  B UE A on TRX with B fwd lean x 10 and posterior lean x 10 with  min A.    -MW        User Key  (r) = Recorded By, (t) = Taken By, (c) = Cosigned By    Initials Name Provider Type    Deysi Isaac, PT Physical Therapist                        PT Assessment/Plan     Row Name 10/30/18 1100          PT Assessment    " "Assessment Comments Pt. requires min to max A with standing dynamic balance.  Pt. has difficulty with cerivcal motion and movement outside TWIN with dynamic activities.  Pt. to benefit from continued therapy services.  -MW        PT Plan    PT Plan Comments Continue with PT services to improve gait, balance, strength, transfers and overall functional mobility.  -MW       User Key  (r) = Recorded By, (t) = Taken By, (c) = Cosigned By    Initials Name Provider Type    Deysi Isaac, PT Physical Therapist                     Exercises     Row Name 10/30/18 1100             Subjective Comments    Subjective Comments \"I still don't feel well to my stomach.  The doctors don't know what's wrong.\"  -MW         Subjective Pain    Able to rate subjective pain? yes  -MW      Pre-Treatment Pain Level 0  -MW      Post-Treatment Pain Level 0  -MW         Total Minutes    29675 - Gait Training Minutes  10  -MW      90761 - PT Therapeutic Exercise Minutes 13  -MW      62122 -  PT Neuromuscular Reeducation Minutes 35  -MW         Exercise 1    Exercise Name 1 NuStep L6  -MW      Time 1 13 min  -MW        User Key  (r) = Recorded By, (t) = Taken By, (c) = Cosigned By    Initials Name Provider Type    Deysi Isaac, PT Physical Therapist                            Therapy Education  Given: Posture/body mechanics, Mobility training  Program: Reinforced  How Provided: Verbal  Provided to: Patient  Level of Understanding: Verbalized              Time Calculation:   Start Time: 1100   Therapy Suggested Charges     Code   Minutes Charges    07746 (CPT®)  Pt Neuromusc Re Education Ea 15 Min 35 2    74419 (CPT®) Hc Pt Ther Proc Ea 15 Min 13 1    72007 (CPT®) Hc Gait Training Ea 15 Min 10 1    96572 (CPT®) Hc Pt Therapeutic Act Ea 15 Min      21586 (CPT®) Hc Pt Manual Therapy Ea 15 Min      50970 (CPT®) Hc Pt Ther Massage- Per 15 Min      67125 (CPT®) Hc Pt Iontophoresis Ea 15 Min      60740 (CPT®)  Pt Elec Stim Ea-Per 15 Min "      91572 (CPT®) Hc Pt Ultrasound Ea 15 Min      53420 (CPT®) Hc Pt Self Care/Mgmt/Train Ea 15 Min      06015 (CPT®) Hc Pt Prosthetic (S) Train Initial Encounter, Each 15 Min      79868 (CPT®) Hc Orthotic(S) Mgmt/Train Initial Encounter, Each 15min      24385 (CPT®) Hc Pt Aquatic Therapy Ea 15 Min      53563 (CPT®) Hc Pt Orthotic(S)/Prosthetic(S) Encounter, Each 15 Min       (CPT®) Hc Pt Electrical Stim Unattended      Total  58 4        Therapy Charges for Today     Code Description Service Date Service Provider Modifiers Qty    72068174648 HC PT NEUROMUSC RE EDUCATION EA 15 MIN 10/30/2018 Deysi Hatch, PT GP 2    69850497071 HC PT THER PROC EA 15 MIN 10/30/2018 Deysi Hatch, PT GP 1    12388178708 HC GAIT TRAINING EA 15 MIN 10/30/2018 Deysi Hatch, PT GP 1                    Deysi Hatch, PT  10/30/2018

## 2018-11-02 ENCOUNTER — APPOINTMENT (OUTPATIENT)
Dept: OCCUPATIONAL THERAPY | Facility: HOSPITAL | Age: 83
End: 2018-11-02

## 2018-11-05 ENCOUNTER — HOSPITAL ENCOUNTER (OUTPATIENT)
Dept: PHYSICAL THERAPY | Facility: HOSPITAL | Age: 83
Setting detail: THERAPIES SERIES
Discharge: HOME | End: 2018-11-05

## 2018-11-05 PROCEDURE — G8979 MOBILITY GOAL STATUS: HCPCS | Performed by: PHYSICAL THERAPIST

## 2018-11-05 PROCEDURE — 97110 THERAPEUTIC EXERCISES: CPT | Performed by: PHYSICAL THERAPIST

## 2018-11-05 PROCEDURE — 97112 NEUROMUSCULAR REEDUCATION: CPT | Performed by: PHYSICAL THERAPIST

## 2018-11-05 PROCEDURE — G8978 MOBILITY CURRENT STATUS: HCPCS | Performed by: PHYSICAL THERAPIST

## 2018-11-05 NOTE — THERAPY PROGRESS REPORT/RE-CERT
.Outpatient Physical Therapy Neuro Re-Assessment  [facility]     Patient Name: Daly Gauthier  : 1935  MRN: [id number]  Today's Date: 2018      Visit Date: 2018    Patient Active Problem List   Diagnosis   • Mild cognitive impairment   • History of right breast cancer   • Cognitive impairment        Past Medical History:   Diagnosis Date   • Anxiety    • Depression    • Diabetes mellitus (CMS/HCC)    • Hyperlipidemia    • Hypertension         No past surgical history on file.      Visit Dx:   No diagnosis found.                  PT Neuro     Row Name 18             Gait/Stairs Assessment/Training    85995 - Gait Training Minutes  10  -MW      Comment (Gait/Stairs) Ambulation on TM with B UE A @ 0.9 mph 0% incline, with min A and VCing to increase B step length and keep chest lifted x 1 min, 3 sets with standing rest breaks in between.  -MW         Balance Skills Training    Training Strategies (Balance) Re-assessment completed, see for details.  Sitting balance on medium swiss ball with B LE marching, TKE, hip ab/adduction x 10 with mod A with LOB 30% of the time.  -MW        User Key  (r) = Recorded By, (t) = Taken By, (c) = Cosigned By    Initials Name Provider Type    Deysi Isaac, PT Physical Therapist                  Therapy Education  Given: Symptoms/condition management, Mobility training  Program: Reinforced  How Provided: Verbal  Provided to: Patient  Level of Understanding: Verbalized          PT OP Goals     Row Name 18          PT Short Term Goals    STG Date to Achieve 10/15/18  -MW     STG 1 Patient to improve GAMBLE balance score to >/= 40/56 to decrease client's risk of falls.  -MW     STG 1 Progress Met  -MW     STG 2 Patient to perform TUG within 13 sec without LOB for improved functional mobility.  -MW     STG 2 Progress Ongoing  -MW     STG 3 Patient to improve FGA score to >/= 15/30 to decrease client's risk of falls.  -MW     STG 3 Progress  Ongoing  -MW     STG 4 Patient to ambulate 10 meters without AD within 12 sec without LOB for improved gait riley and functional mobility.  -     STG 4 Progress Ongoing  -        Long Term Goals    LTG Date to Achieve 11/19/18  -MW     LTG 1 Patient to improve GAMBLE balance score to >/= 48/56 to decrease client's risk of falls.  -MW     LTG 1 Progress Ongoing  -MW     LTG 2 Patient to perform TUG within 11 sec without LOB for improved functional mobility.  -MW     LTG 2 Progress Ongoing  -MW     LTG 3 Patient to improve FGA score to >/= 22/30 to decrease client's risk of falls.  -MW     LTG 3 Progress Ongoing  -MW     LTG 4 Patient to ambulate 10 meters without AD within 10 sec without LOB for improved gait riley and functional mobility.  -MW     LTG 4 Progress Ongoing  -MW     LTG 5 Pt. to perform 8 sit to stands within 30 seconds without UE A for improved functional mobility.    -MW     LTG 5 Progress Ongoing  -MW     LTG 6 Patient to be I with HEP.  -     LTG 6 Progress Ongoing  -        Time Calculation    PT Goal Re-Cert Due Date 12/09/18  -       User Key  (r) = Recorded By, (t) = Taken By, (c) = Cosigned By    Initials Name Provider Type    Deysi Isaac, PT Physical Therapist                PT Assessment/Plan     Row Name 11/05/18 0930          PT Assessment    Functional Limitations Impaired gait;Impaired locomotion;Limitations in community activities;Performance in self-care ADL  -MW     Impairments Balance;Cognition;Coordination;Endurance;Gait;Impaired flexibility;Locomotion;Muscle strength;Posture  -     Assessment Comments Pt. did not meet any goals today.  Pt. demonstrates decreased gait riley.  Pt continues to demonstrate decreased B step length and has little carryover from TM to reg gait on the floor.  PT. to benefit from continued therapy services through the end of the month to meet goals.  -MW     Please refer to paper survey for additional self-reported information Yes   "-MW     Rehab Potential Good  -MW     Patient/caregiver participated in establishment of treatment plan and goals Yes  -MW     Patient would benefit from skilled therapy intervention Yes  -MW        PT Plan    PT Frequency 1x/week  -MW     Predicted Duration of Therapy Intervention (Therapy Eval) 3 visits  -MW     Planned CPT's? PT THER PROC EA 15 MIN: 26981;PT THER ACT EA 15 MIN: 56574;PT NEUROMUSC RE-EDUCATION EA 15 MIN: 70981;PT GAIT TRAINING EA 15 MIN: 56509  -MW     PT Plan Comments Continue with PT services to improve gait, balance, strength and overall functional mobility.  -MW       User Key  (r) = Recorded By, (t) = Taken By, (c) = Cosigned By    Initials Name Provider Type    Deysi Isaac, PT Physical Therapist                   Exercises     Row Name 11/05/18 0953             Subjective Comments    Subjective Comments \"I'm just so sick to my stomach.  I've been having diarrhea so bad.  I took some medicine.\"  -MW         Subjective Pain    Able to rate subjective pain? yes  -MW      Pre-Treatment Pain Level 0  -MW      Post-Treatment Pain Level 0  -MW         Total Minutes    44638 - Gait Training Minutes  10  -MW      28929 - PT Therapeutic Exercise Minutes 10  -MW      47136 -  PT Neuromuscular Reeducation Minutes 25  -MW         Exercise 1    Exercise Name 1 NuStep L6  -MW      Time 1 10 min  -MW        User Key  (r) = Recorded By, (t) = Taken By, (c) = Cosigned By    Initials Name Provider Type    Deysi Isaac, PT Physical Therapist                      Outcome Measure Options: 10 Meter Walk, FGA (Functional Gait Assessment), Timed Up and Go (TUG)  10 Meter Walk Test Self-Selected Velocity  Self-Selected Velocity: Trial 1: 16.89 sec.  Functional Gait Assessment (FGA)  Gait Level Surface: Moderate Impairment  Change in Gait Speed: Moderate Impairment  Gait with Horizontal Head Turns: Moderate Impairment  Gait with Vertical Head Turns: Moderate Impairment  Gait and Pivot Turn: Mild " Impairment  Step Over Obstacle: Moderate Impairment  Gait with Narrow Base of Support: Severe Impairment  Gait with Eyes Closed: Severe Impairment  Ambulating Backwards: Moderate Impairment  Steps: Mild Impairment  FGA Total Score: 10  Timed Up and Go (TUG)  TUG Test 1: 20.19 seconds    Time Calculation:   Start Time: 0930   Therapy Suggested Charges     Code   Minutes Charges    05454 (CPT®) Hc Pt Neuromusc Re Education Ea 15 Min 25 2    80411 (CPT®) Hc Pt Ther Proc Ea 15 Min 10 1    75761 (CPT®) Hc Gait Training Ea 15 Min 10     21082 (CPT®) Hc Pt Therapeutic Act Ea 15 Min      37705 (CPT®) Hc Pt Manual Therapy Ea 15 Min      29618 (CPT®) Hc Pt Ther Massage- Per 15 Min      83795 (CPT®) Hc Pt Iontophoresis Ea 15 Min      19463 (CPT®) Hc Pt Elec Stim Ea-Per 15 Min      15400 (CPT®) Hc Pt Ultrasound Ea 15 Min      40825 (CPT®) Hc Pt Self Care/Mgmt/Train Ea 15 Min      37254 (CPT®) Hc Pt Prosthetic (S) Train Initial Encounter, Each 15 Min      54839 (CPT®) Hc Orthotic(S) Mgmt/Train Initial Encounter, Each 15min      50745 (CPT®) Hc Pt Aquatic Therapy Ea 15 Min      50910 (CPT®) Hc Pt Orthotic(S)/Prosthetic(S) Encounter, Each 15 Min       (CPT®) Hc Pt Electrical Stim Unattended      Total  45 3        Therapy Charges for Today     Code Description Service Date Service Provider Modifiers Qty    36142489508 HC PT MOBILITY CURRENT 11/5/2018 Deysi Hatch, PT GP, CL 1    65965569184 HC PT MOBILITY PROJECTED 11/5/2018 Deysi Hatch, PT GP, CJ 1    84006410346 HC PT NEUROMUSC RE EDUCATION EA 15 MIN 11/5/2018 Deysi Hatch, PT GP 2    40404767648 HC PT THER PROC EA 15 MIN 11/5/2018 Deysi Hatch, PT GP 1          PT Jennifer  PT Professional Judgement Used?: Yes  Outcome Measure Options: 10 Meter Walk, FGA (Functional Gait Assessment), Timed Up and Go (TUG)  FGA Total Score: 10  TUG Test 1: 20.19 seconds  Functional Limitation: Mobility: Walking and moving around  Mobility: Walking and Moving Around  Current Status (): At least 60 percent but less than 80 percent impaired, limited or restricted  Mobility: Walking and Moving Around Goal Status (): At least 20 percent but less than 40 percent impaired, limited or restricted         Deysi Hatch, PT  11/5/2018

## 2018-11-07 ENCOUNTER — OFFICE VISIT (OUTPATIENT)
Dept: NEUROLOGY | Facility: CLINIC | Age: 83
End: 2018-11-07

## 2018-11-07 VITALS
WEIGHT: 170 LBS | HEIGHT: 63 IN | DIASTOLIC BLOOD PRESSURE: 82 MMHG | BODY MASS INDEX: 30.12 KG/M2 | SYSTOLIC BLOOD PRESSURE: 140 MMHG | HEART RATE: 76 BPM

## 2018-11-07 DIAGNOSIS — R41.89 COGNITIVE IMPAIRMENT: Primary | ICD-10-CM

## 2018-11-07 PROCEDURE — 99214 OFFICE O/P EST MOD 30 MIN: CPT | Performed by: PHYSICIAN ASSISTANT

## 2018-11-07 NOTE — PROGRESS NOTES
"Subjective     Chief Complaint: memory loss      History of Present Illness   Daly Gauthier is a 83 y.o. female who returns to clinic today with a difficult history of cognitive impairment. She and her family have noted a progressive cognitive decline since 2011 when she underwent chemotherapy and radiation for breast cancer. This has been marked over time by impairments in memory, orientation, language and executive function. There have also been associated symptoms of depression and apathy.      An MRI and screening bloodwork have been unrevealing. She has been treated with donepezil, and has been intolerant of Namenda due to dizziness. She saw Dr. Urrutia at  in March, 2017 and was diagnosed with vascular dementia by report.      It is noted that she had a spell in 9/18 during which her family states that she seemed \"out of it\". This lasted for approximately 20 minutes. During the episode, her family notes that she stared ahead blankly. She is amnestic of the even, though her family notes that she was able to answer some questions during this period of time. Her family notes associated fatigue and confusion following the spell. Her family denies associated convulsions, urinary incontinence, or tongue biting. She has had 5-6 similar spells over the last 5-6 years. An EEG in 11/17 was unremarkable as well as a repeat EEG in 10/18.      Today: Since her last visit in 10/18, she feels essentially unchanged. Her family notes that her cognition is somewhat improved. She and her family deny any recurrent spells. She is scheduled to follow with Behavioral Health in a week or so for her depression.       I have reviewed and confirmed the past family, social and medical history as accurate on 11/7/18.     Review of Systems   Constitutional: Negative.    HENT: Negative.    Eyes: Negative.    Respiratory: Negative.    Cardiovascular: Negative.    Gastrointestinal: Negative.    Endocrine: Negative.    Genitourinary: " "Negative.    Musculoskeletal: Negative.    Skin: Negative.    Allergic/Immunologic: Negative.    Neurological:        Memory loss     Hematological: Negative.    Psychiatric/Behavioral: Negative.        Objective     /82   Pulse 76   Ht 160 cm (62.99\")   Wt 77.1 kg (170 lb)   BMI 30.12 kg/m²     General appearance today is normal.     Physical Exam   Neurological: She has normal strength. She has a normal Finger-Nose-Finger Test.   Psychiatric: Her speech is normal.        Neurologic Exam     Mental Status   Oriented to person.   Oriented to place.   Oriented to time. Disoriented to day. Oriented to year, month, date and season.   Registration: recalls 3 of 3 objects. Recall at 5 minutes: recalls 3 of 3 objects. Follows 3 step commands.   Attention: 2/5 sequencing.   Speech: speech is normal   Level of consciousness: alert  Able to name object. Able to read. Able to repeat. Able to write. Normal comprehension.     Cranial Nerves   Cranial nerves II through XII intact.     Motor Exam   Muscle bulk: normal  Overall muscle tone: normal    Strength   Strength 5/5 throughout.     Sensory Exam   Light touch normal.     Gait, Coordination, and Reflexes     Coordination   Finger to nose coordination: normal    Tremor   Resting tremor: absent        Results  MMSE=26      Assessment/Plan   Daly was seen today for memory loss.    Diagnoses and all orders for this visit:    Cognitive impairment          Discussion/Summary   Daly Gauthier returns to clinic today with a history of cognitive impairment. While a vascular dementia as proposed by Dr. Urrutia at  is possible, I continue to be concerned about the contribution of depression, which I again discussed today. She may have some degree of parkinsonism, which could be related to vascular disease and/or Abilify, though we are not convinced this is a major contributor to her current status. After discussing potential treatment options, it was elected to continue " on  donepezil unchanged. She will continue to work closely with PT. She will then follow up in 3 months, or sooner if needed.   I spent 30 minutes minutes face to face with the patient and family with 20 minutes spent on discussing diagnosis, evaluation, current status, treatment options and management as discussed above.       As part of this visit I obtained additional history from the family which is incorporated in the HPI.      Shante Lopez PA-C

## 2018-11-16 ENCOUNTER — HOSPITAL ENCOUNTER (OUTPATIENT)
Dept: PHYSICAL THERAPY | Facility: HOSPITAL | Age: 83
Setting detail: THERAPIES SERIES
Discharge: HOME | End: 2018-11-16

## 2018-11-16 DIAGNOSIS — R26.89 BALANCE PROBLEM: ICD-10-CM

## 2018-11-16 DIAGNOSIS — R26.9 GAIT ABNORMALITY: Primary | ICD-10-CM

## 2018-11-16 PROCEDURE — 97112 NEUROMUSCULAR REEDUCATION: CPT | Performed by: PHYSICAL THERAPIST

## 2018-11-16 PROCEDURE — 97110 THERAPEUTIC EXERCISES: CPT | Performed by: PHYSICAL THERAPIST

## 2018-11-16 NOTE — THERAPY TREATMENT NOTE
"    Outpatient Physical Therapy Neuro Treatment Note  [facility]     Patient Name: Daly Gauthier  : 1935  MRN: [id number]  Today's Date: 2018      Visit Date: 2018    Visit Dx:    ICD-10-CM ICD-9-CM   1. Gait abnormality R26.9 781.2   2. Balance problem R26.89 781.99       Patient Active Problem List   Diagnosis   • Mild cognitive impairment   • History of right breast cancer   • Cognitive impairment           PT Neuro     Row Name 18 1100             Subjective Comments    Subjective Comments  \"I've been trying to get out of this all morning.  I woke up in the middle of the night sick and then I woke up at 6 am.  My doctor put me on a new medicine and I surely hope it helps with my stomach.  I've only been on it for two days.  I just feel weak.\"  -MW         Subjective Pain    Able to rate subjective pain?  yes  -MW      Pre-Treatment Pain Level  0  -MW      Post-Treatment Pain Level  0  -MW         Balance Skills Training    Training Strategies (Balance)  St. balance without UE A and step up onto/off 6\" step x 10 with min/mod A with LOB 30% of the time.  Pt. fearful of falling and takes smaller steps/movements.  Ambulation with emphasis on increased B step length and performing activities with increased B knee extension.  Issued HEP with sit to stand without UE A.    -        User Key  (r) = Recorded By, (t) = Taken By, (c) = Cosigned By    Initials Name Provider Type     Deysi Hatch, PT Physical Therapist                  PT Assessment/Plan     Row Name 18 1100          PT Assessment    Assessment Comments  Pt. requries min/mod A with standing balance activites and demonstrates decreased B step length and occasional absent B heel strike.  Pt. to benefit from skilled PT services to improve gait, balance, strength, transfers and overall functional mobility.  -        PT Plan    PT Plan Comments  Continue with PT services to improve gait, balance, strength, transfers " "and overall functional mobility.  -MW       User Key  (r) = Recorded By, (t) = Taken By, (c) = Cosigned By    Initials Name Provider Type    Deysi Isaac, PT Physical Therapist               Exercises     Row Name 11/16/18 1100             Subjective Comments    Subjective Comments  \"I've been trying to get out of this all morning.  I woke up in the middle of the night sick and then I woke up at 6 am.  My doctor put me on a new medicine and I surely hope it helps with my stomach.  I've only been on it for two days.  I just feel weak.\"  -MW         Subjective Pain    Able to rate subjective pain?  yes  -MW      Pre-Treatment Pain Level  0  -MW      Post-Treatment Pain Level  0  -MW         Total Minutes    40334 - PT Therapeutic Exercise Minutes  15  -MW      08293 -  PT Neuromuscular Reeducation Minutes  30  -MW         Exercise 1    Exercise Name 1  NuStep L6  -MW      Time 1  10 min  -MW         Exercise 2    Exercise Name 2  DLP  -MW      Sets 2  2  -MW      Time 2  2 min  -MW         Exercise 3    Exercise Name 3  Seated stool pull  -MW      Time 3  3 min  -MW        User Key  (r) = Recorded By, (t) = Taken By, (c) = Cosigned By    Initials Name Provider Type    Deysi Isaac, PT Physical Therapist                            Therapy Education  Given: Symptoms/condition management, Mobility training, Posture/body mechanics  Program: Reinforced  How Provided: Verbal, Demonstration, Written  Provided to: Patient, Caregiver  Level of Understanding: Verbalized, Teach back education performed              Time Calculation:   Start Time: 1115   Therapy Suggested Charges     Code   Minutes Charges    18362 (CPT®) Hc Pt Neuromusc Re Education Ea 15 Min 30 2    14044 (CPT®) Hc Pt Ther Proc Ea 15 Min 15 1    82042 (CPT®) Hc Gait Training Ea 15 Min      60994 (CPT®) Hc Pt Therapeutic Act Ea 15 Min      60462 (CPT®) Hc Pt Manual Therapy Ea 15 Min      15155 (CPT®) Hc Pt Ther Massage- Per 15 Min      76584 " (CPT®) Hc Pt Iontophoresis Ea 15 Min      51494 (CPT®) Hc Pt Elec Stim Ea-Per 15 Min      70371 (CPT®) Hc Pt Ultrasound Ea 15 Min      82025 (CPT®) Hc Pt Self Care/Mgmt/Train Ea 15 Min      46332 (CPT®) Hc Pt Prosthetic (S) Train Initial Encounter, Each 15 Min      34230 (CPT®) Hc Orthotic(S) Mgmt/Train Initial Encounter, Each 15min      29022 (CPT®) Hc Pt Aquatic Therapy Ea 15 Min      74878 (CPT®) Hc Pt Orthotic(S)/Prosthetic(S) Encounter, Each 15 Min       (CPT®) Hc Pt Electrical Stim Unattended      Total  45 3        Therapy Charges for Today     Code Description Service Date Service Provider Modifiers Qty    99499282651 HC PT NEUROMUSC RE EDUCATION EA 15 MIN 11/16/2018 Deysi Hatch, PT GP 2    59734746790 HC PT THER PROC EA 15 MIN 11/16/2018 Deysi Hatch, PT GP 1                    Deysi Hatch, PT  11/16/2018

## 2018-11-19 ENCOUNTER — HOSPITAL ENCOUNTER (OUTPATIENT)
Dept: PHYSICAL THERAPY | Facility: HOSPITAL | Age: 83
Setting detail: THERAPIES SERIES
Discharge: HOME | End: 2018-11-19

## 2018-11-19 DIAGNOSIS — R26.89 BALANCE PROBLEM: ICD-10-CM

## 2018-11-19 DIAGNOSIS — R26.9 GAIT ABNORMALITY: Primary | ICD-10-CM

## 2018-11-19 PROCEDURE — 97110 THERAPEUTIC EXERCISES: CPT | Performed by: PHYSICAL THERAPIST

## 2018-11-19 PROCEDURE — 97116 GAIT TRAINING THERAPY: CPT | Performed by: PHYSICAL THERAPIST

## 2018-11-19 PROCEDURE — 97112 NEUROMUSCULAR REEDUCATION: CPT | Performed by: PHYSICAL THERAPIST

## 2018-11-19 NOTE — THERAPY TREATMENT NOTE
"    Outpatient Physical Therapy Neuro Treatment Note  [facility]     Patient Name: Daly Gauthier  : 1935  MRN: [id number]  Today's Date: 2018      Visit Date: 2018    Visit Dx:    ICD-10-CM ICD-9-CM   1. Gait abnormality R26.9 781.2   2. Balance problem R26.89 781.99       Patient Active Problem List   Diagnosis   • Mild cognitive impairment   • History of right breast cancer   • Cognitive impairment           PT Neuro     Row Name 18 1400             Subjective Comments    Subjective Comments  \"I don't think the new medicine is doing much.  I know that I could not fall asleep last night.  I think it kept me awake.\"  -MW         Subjective Pain    Able to rate subjective pain?  yes  -MW      Pre-Treatment Pain Level  0  -MW      Post-Treatment Pain Level  0  -MW         Gait/Stairs Assessment/Training    Comment (Gait/Stairs)  Ambulation with toss/catch, increased riley, EC, retroambulation for 75' each with min A and VCing for pt to increase B step length and effort.  Pt. fearful of falling.  -MW         Balance Skills Training    Training Strategies (Balance)  St. balance with fwd and sidestepping along balance beam without UE A 8 ft x 2 with min A.  Tandem standing on beam with holding 2# bar and raising overhead x 10, trunk rotation x 10 with min A for balance.  Quad opposite UE/LE's x 5 with min A for balance.  Tall kneeling with attempted fwd walking but pt was unable to lift LE's.  Tall kneeling with VCing to keep chest lifted and then going down into prone, pressing up into tall kneeling with SBA.    -        User Key  (r) = Recorded By, (t) = Taken By, (c) = Cosigned By    Initials Name Provider Type    Deysi Isaac, PT Physical Therapist                  PT Assessment/Plan     Row Name 18 1400          PT Assessment    Assessment Comments  Pt. fearful of falling with standing dynmaic balance activities along with retroambulation and gait trng with EC.  Pt. to " "benefit from PT serivces to improve overall functional mobility.  -MW        PT Plan    PT Plan Comments  Continue with PT services to improve gait, balance, strength, transfers and overall functional mobility.  -MW       User Key  (r) = Recorded By, (t) = Taken By, (c) = Cosigned By    Initials Name Provider Type    Deysi Isaac, PT Physical Therapist               Exercises     Row Name 11/19/18 1400             Subjective Comments    Subjective Comments  \"I don't think the new medicine is doing much.  I know that I could not fall asleep last night.  I think it kept me awake.\"  -MW         Subjective Pain    Able to rate subjective pain?  yes  -MW      Pre-Treatment Pain Level  0  -MW      Post-Treatment Pain Level  0  -MW         Total Minutes    04253 - Gait Training Minutes   15  -MW      98318 - PT Therapeutic Exercise Minutes  10  -MW      79887 -  PT Neuromuscular Reeducation Minutes  30  -MW         Exercise 1    Exercise Name 1  NuStep L6  -MW      Time 1  10 min  -MW        User Key  (r) = Recorded By, (t) = Taken By, (c) = Cosigned By    Initials Name Provider Type    Deysi Isaac, PT Physical Therapist                            Therapy Education  Given: Mobility training, Posture/body mechanics, Symptoms/condition management  Program: Reinforced  How Provided: Verbal  Provided to: Patient  Level of Understanding: Verbalized              Time Calculation:   Start Time: 1400   Therapy Suggested Charges     Code   Minutes Charges    26507 (CPT®)  Pt Neuromusc Re Education Ea 15 Min 30 2    47620 (CPT®) Hc Pt Ther Proc Ea 15 Min 10 1    96551 (CPT®) Hc Gait Training Ea 15 Min 15 1    83251 (CPT®) Hc Pt Therapeutic Act Ea 15 Min      51948 (CPT®) Hc Pt Manual Therapy Ea 15 Min      36983 (CPT®) Hc Pt Ther Massage- Per 15 Min      09923 (CPT®)  Pt Iontophoresis Ea 15 Min      98028 (CPT®)  Pt Elec Stim Ea-Per 15 Min      41235 (CPT®) Hc Pt Ultrasound Ea 15 Min      31166 (CPT®)  Pt " Self Care/Mgmt/Train Ea 15 Min      51258 (CPT®) Hc Pt Prosthetic (S) Train Initial Encounter, Each 15 Min      00099 (CPT®) Hc Orthotic(S) Mgmt/Train Initial Encounter, Each 15min      91107 (CPT®) Hc Pt Aquatic Therapy Ea 15 Min      82556 (CPT®) Hc Pt Orthotic(S)/Prosthetic(S) Encounter, Each 15 Min       (CPT®) Hc Pt Electrical Stim Unattended      Total  55 4        Therapy Charges for Today     Code Description Service Date Service Provider Modifiers Qty    49885967793 HC PT NEUROMUSC RE EDUCATION EA 15 MIN 11/19/2018 Deysi Hatch, PT GP 2    09443620398 HC PT THER PROC EA 15 MIN 11/19/2018 Deysi Hatch, PT GP 1    61284608554 HC GAIT TRAINING EA 15 MIN 11/19/2018 Deysi Hatch, PT GP 1                    Deysi Hatch, PT  11/19/2018

## 2018-11-26 ENCOUNTER — HOSPITAL ENCOUNTER (OUTPATIENT)
Dept: PHYSICAL THERAPY | Facility: HOSPITAL | Age: 83
Setting detail: THERAPIES SERIES
Discharge: HOME | End: 2018-11-26

## 2018-11-26 DIAGNOSIS — R26.9 GAIT ABNORMALITY: Primary | ICD-10-CM

## 2018-11-26 DIAGNOSIS — R26.89 BALANCE PROBLEM: ICD-10-CM

## 2018-11-26 PROCEDURE — G8979 MOBILITY GOAL STATUS: HCPCS | Performed by: PHYSICAL THERAPIST

## 2018-11-26 PROCEDURE — 97110 THERAPEUTIC EXERCISES: CPT | Performed by: PHYSICAL THERAPIST

## 2018-11-26 PROCEDURE — G8978 MOBILITY CURRENT STATUS: HCPCS | Performed by: PHYSICAL THERAPIST

## 2018-11-26 PROCEDURE — 97116 GAIT TRAINING THERAPY: CPT | Performed by: PHYSICAL THERAPIST

## 2018-11-26 PROCEDURE — G8980 MOBILITY D/C STATUS: HCPCS | Performed by: PHYSICAL THERAPIST

## 2018-11-26 PROCEDURE — 97112 NEUROMUSCULAR REEDUCATION: CPT | Performed by: PHYSICAL THERAPIST

## 2018-11-26 NOTE — THERAPY DISCHARGE NOTE
"      Outpatient Physical Therapy Neuro Treatment Note/Discharge Summary   [facility]     Patient Name: Daly Gauthier  : 1935  MRN: [id number]  Today's Date: 2018      Visit Date: 2018    Visit Dx:    ICD-10-CM ICD-9-CM   1. Gait abnormality R26.9 781.2   2. Balance problem R26.89 781.99       Patient Active Problem List   Diagnosis   • Mild cognitive impairment   • History of right breast cancer   • Cognitive impairment            PT Neuro     Row Name 18 1400             Subjective Comments    Subjective Comments  \"I'm dizzy today.\"  -MW         Subjective Pain    Able to rate subjective pain?  yes  -MW      Pre-Treatment Pain Level  0  -MW      Post-Treatment Pain Level  0  -MW         Gait/Stairs Assessment/Training    [place] Level (Stairs)  contact guard  -MW      Handrail Location (Stairs)  both sides  -MW      Number of Steps (Stairs)  12 x 2  -MW      Ascending Technique (Stairs)  step-over-step  -MW      Descending Technique (Stairs)  step-over-step  -MW      Stairs, Impairments  strength decreased;impaired balance  -MW      Comment (Gait/Stairs)  pt had to pause for SOA and to ascend steps for the second time  -MW         Balance Skills Training    Training Strategies (Balance)  Discharge completed, see for details.  B fwd alteranting to BOSU with B UE A x 5 and without UE A x 10 with CGA/VCing to increase confidence. B side lunge to BOSU without UE A x 5 with CGA.  -MW        User Key  (r) = Recorded By, (t) = Taken By, (c) = Cosigned By    Initials Name Provider Type    Deysi Isaac, PT Physical Therapist                  PT Assessment/Plan     Row Name 18 1400          PT Assessment    Assessment Comments  Pt. met STG for GAMBLE and LTG for sit to stands.  Pt. has made little progress with gait irley but has improved with standing and ambulatory balance.  Pt. continues to have fear of falling and was re-educated to perform HEP upon discharge.  -MW        " "PT Plan    PT Plan Comments  Discharge from PT services and continue with HEP.  -MW       User Key  (r) = Recorded By, (t) = Taken By, (c) = Cosigned By    Initials Name Provider Type    Deysi Isaac, PT Physical Therapist               Exercises     Row Name 11/26/18 1400             Subjective Comments    Subjective Comments  \"I'm dizzy today.\"  -MW         Subjective Pain    Able to rate subjective pain?  yes  -MW      Pre-Treatment Pain Level  0  -MW      Post-Treatment Pain Level  0  -MW         Total Minutes    05601 - Gait Training Minutes   15  -MW      24357 - PT Therapeutic Exercise Minutes  10  -MW      71552 -  PT Neuromuscular Reeducation Minutes  20  -MW         Exercise 1    Exercise Name 1  NuStep L6  -MW      Time 1  10 min  -MW        User Key  (r) = Recorded By, (t) = Taken By, (c) = Cosigned By    Initials Name Provider Type    Deysi Isaac, PT Physical Therapist                        PT OP Goals     Row Name 11/26/18 1400          PT Short Term Goals    STG Date to Achieve  10/15/18  -MW     STG 1  Patient to improve GAMBLE balance score to >/= 40/56 to decrease client's risk of falls.  -MW     STG 1 Progress  Met  -MW     STG 2  Patient to perform TUG within 13 sec without LOB for improved functional mobility.  -MW     STG 2 Progress  Not Met  -MW     STG 3  Patient to improve FGA score to >/= 15/30 to decrease client's risk of falls.  -MW     STG 3 Progress  Not Met  -MW     STG 4  Patient to ambulate 10 meters without AD within 12 sec without LOB for improved gait riley and functional mobility.  -MW     STG 4 Progress  Not Met  -MW        Long Term Goals    LTG Date to Achieve  11/19/18  -MW     LTG 1  Patient to improve GAMBLE balance score to >/= 48/56 to decrease client's risk of falls.  -MW     LTG 1 Progress  Not Met  -MW     LTG 2  Patient to perform TUG within 11 sec without LOB for improved functional mobility.  -MW     LTG 2 Progress  Not Met  -MW     LTG 3  Patient to " improve FGA score to >/= 22/30 to decrease client's risk of falls.  -MW     LTG 3 Progress  Not Met  -MW     LTG 4  Patient to ambulate 10 meters without AD within 10 sec without LOB for improved gait riley and functional mobility.  -MW     LTG 4 Progress  Not Met  -MW     LTG 5  Pt. to perform 8 sit to stands within 30 seconds without UE A for improved functional mobility.    -MW     LTG 5 Progress  Met  -MW     LTG 6  Patient to be I with HEP.  -MW     LTG 6 Progress  Met  -MW        Time Calculation    PT Goal Re-Cert Due Date  12/09/18  -MW       User Key  (r) = Recorded By, (t) = Taken By, (c) = Cosigned By    Initials Name Provider Type    Deysi Isaac, PT Physical Therapist          Therapy Education  Given: Symptoms/condition management, Posture/body mechanics, Mobility training  Program: Reinforced  How Provided: Verbal  Provided to: Patient, Caregiver  Level of Understanding: Verbalized, Teach back education performed    Outcome Measure Options: 10 Meter Walk, FGA (Functional Gait Assessment), Timed Up and Go (TUG), Duval Balance  10 Meter Walk Test Self-Selected Velocity  Self-Selected Velocity: Trial 1: 14.02 sec.  10 Meter Walk Test Fast Velocity  10 Meter Walk Fast Velocity: Trial 1: 12.49 sec.  30 Second Chair Stand Test  30 Second Chair Stand Test: 9  Duval Balance Scale  Sitting to Standing: able to stand without using hands and stabilize independently  Standing Unsupported: able to stand safely for 2 minutes  Sitting with Back Unsupported but Feet Supported on Floor or on Stool: able to sit safely and securely for 2 minutes  Standing to Sitting: sits safely with minimal use of hands  Transfers: able to transfer safely definite need of hands  Standing Unsupported with Eyes Closed: able to stand 10 seconds safely  Standing Unsupported with Feet Together: able to place feet together independently and stand 1 minute safely  Reaching Forward with Outstretched Arm While Standing: can reach  forward confidently 25 cm (10 inches)   Object From the Floor From a Standing Position: able to  object safely and easily  Turning to Look Behind Over Left and Right Shoulders While Standing: looks behind one side only other side shows less weight shift  Turn 360 Degrees: able to turn 360 degrees safely but slowly  Place Alternate Foot on Step or Stool While Standing Unsupported: able to complete 4 steps without aid with supervision  Standing Unsupported with One Foot in Front: able to place foot ahead independently and hold 30 seconds  Standing on One Leg: able to lift leg independently and hold >/equal to 3 seconds  Duval Total Score: 47  Functional Gait Assessment (FGA)  Gait Level Surface: Mild Impairment  Change in Gait Speed: Mild Impairment  Gait with Horizontal Head Turns: Moderate Impairment  Gait with Vertical Head Turns: Moderate Impairment  Gait and Pivot Turn: Mild Impairment  Step Over Obstacle: Moderate Impairment  Gait with Narrow Base of Support: Severe Impairment  Gait with Eyes Closed: Moderate Impairment  Ambulating Backwards: Moderate Impairment  Steps: Mild Impairment  FGA Total Score: 13  Timed Up and Go (TUG)  TUG Test 1: 17.36 seconds    Time Calculation:   Start Time: 1400   Therapy Suggested Charges     Code   Minutes Charges    96722 (CPT®) Hc Pt Neuromusc Re Education Ea 15 Min 20 1    81354 (CPT®) Hc Pt Ther Proc Ea 15 Min 10 1    90632 (CPT®) Hc Gait Training Ea 15 Min 15 1    80146 (CPT®) Hc Pt Therapeutic Act Ea 15 Min      38047 (CPT®) Hc Pt Manual Therapy Ea 15 Min      46226 (CPT®) Hc Pt Ther Massage- Per 15 Min      08991 (CPT®) Hc Pt Iontophoresis Ea 15 Min      52484 (CPT®) Hc Pt Elec Stim Ea-Per 15 Min      80220 (CPT®) Hc Pt Ultrasound Ea 15 Min      57929 (CPT®) Hc Pt Self Care/Mgmt/Train Ea 15 Min      46811 (CPT®) Hc Pt Prosthetic (S) Train Initial Encounter, Each 15 Min      95579 (CPT®) Hc Orthotic(S) Mgmt/Train Initial Encounter, Each 15min      08663  (CPT®) Hc Pt Aquatic Therapy Ea 15 Min      18759 (CPT®) Hc Pt Orthotic(S)/Prosthetic(S) Encounter, Each 15 Min       (CPT®) Hc Pt Electrical Stim Unattended      Total  45 3        Therapy Charges for Today     Code Description Service Date Service Provider Modifiers Qty    95663360820 HC PT MOBILITY CURRENT 11/26/2018 Deysi Hatch, PT GP, CK 1    35916029440 HC PT MOBILITY PROJECTED 11/26/2018 Deysi Hatch, PT GP, CJ 1    27446716734 HC PT MOBILITY DISCHARGE 11/26/2018 Deysi Hatch, PT GP, CK 1    30807818713 HC PT NEUROMUSC RE EDUCATION EA 15 MIN 11/26/2018 Deysi Hatch, PT GP 1    35726593179 HC PT THER PROC EA 15 MIN 11/26/2018 Deysi Hatch, PT GP 1    33964547351 HC GAIT TRAINING EA 15 MIN 11/26/2018 Deysi Hatch, PT GP 1          PT G-Codes  PT Professional Judgement Used?: Yes  Outcome Measure Options: 10 Meter Walk, FGA (Functional Gait Assessment), Timed Up and Go (TUG), Duval Balance  Duval Total Score: 47  FGA Total Score: 13  TUG Test 1: 17.36 seconds  Functional Limitation: Mobility: Walking and moving around  Mobility: Walking and Moving Around Current Status (): At least 40 percent but less than 60 percent impaired, limited or restricted  Mobility: Walking and Moving Around Goal Status (): At least 20 percent but less than 40 percent impaired, limited or restricted  Mobility: Walking and Moving Around Discharge Status (): At least 40 percent but less than 60 percent impaired, limited or restricted     OP PT Discharge Summary  Date of Discharge: 11/26/18  Reason for Discharge: Maximum functional potential achieved  Outcomes Achieved: Patient able to partially acheive established goals  Discharge Destination: Home with home program        Deysi Hatch, PT  11/26/2018

## 2019-02-22 ENCOUNTER — OFFICE VISIT (OUTPATIENT)
Dept: NEUROLOGY | Facility: CLINIC | Age: 84
End: 2019-02-22

## 2019-02-22 VITALS — BODY MASS INDEX: 30.12 KG/M2 | WEIGHT: 170 LBS | HEIGHT: 63 IN | HEART RATE: 72 BPM

## 2019-02-22 DIAGNOSIS — R41.89 COGNITIVE IMPAIRMENT: Primary | ICD-10-CM

## 2019-02-22 PROCEDURE — 99214 OFFICE O/P EST MOD 30 MIN: CPT | Performed by: PSYCHIATRY & NEUROLOGY

## 2019-02-22 RX ORDER — BUSPIRONE HYDROCHLORIDE 10 MG/1
5 TABLET ORAL 2 TIMES DAILY
Refills: 0 | COMMUNITY
Start: 2019-01-31

## 2019-02-22 RX ORDER — MEMANTINE HYDROCHLORIDE 10 MG/1
10 TABLET ORAL DAILY
Qty: 30 TABLET | Refills: 11 | Status: SHIPPED | OUTPATIENT
Start: 2019-02-22 | End: 2020-01-20

## 2019-02-22 RX ORDER — LANCETS
EACH MISCELLANEOUS
Refills: 0 | COMMUNITY
Start: 2018-11-25 | End: 2020-10-07

## 2019-02-22 RX ORDER — FAMOTIDINE 40 MG/1
TABLET, FILM COATED ORAL
Refills: 0 | COMMUNITY
Start: 2019-02-17 | End: 2021-10-22

## 2019-02-22 NOTE — PROGRESS NOTES
"Subjective     Chief Complaint: memory loss      History of Present Illness   Daly Gauthier is a 83 y.o. female who returns to clinic today with a difficult history of cognitive impairment. She and her family have noted a progressive cognitive decline since 2011 when she underwent chemotherapy and radiation for breast cancer. This has been marked over time by impairments in memory, orientation, language and executive function. There have also been associated symptoms of depression and apathy.      An MRI and screening bloodwork have been unrevealing. She has been treated with donepezil, and has been intolerant of Namenda due to dizziness and hallucinations at a dosage of 15 mg per day. She saw Dr. Urrutia at  in March, 2017 and was diagnosed with vascular dementia by report.      It is noted that she had a spell in 9/18 during which her family states that she seemed \"out of it\". This lasted for approximately 20 minutes. During the episode, her family noted that she stared ahead blankly. She is amnestic of the even, though her family notes that she was able to answer some questions during this period of time. Her family notes associated fatigue and confusion following the spell. Her family denies associated convulsions, urinary incontinence, or tongue biting. She has had 5-6 similar spells over the last 5-6 years. An EEG in 11/17 was unremarkable as well as a repeat EEG in 10/18.      Since her last visit on 11/7/18, she has started Buspar under the direction of behavioral health. She feels that her cognition is less clear. She has become considerably more sedentary. She is much more apathetic. Her family notes that her orientation has worsened.       I have reviewed and confirmed the past family, social and medical history as accurate on 2/22/19.     Review of Systems   Constitutional: Negative.        Objective     Pulse 72   Ht 160 cm (62.99\")   Wt 77.1 kg (170 lb)   BMI 30.12 kg/m²     General appearance " today is normal.     Physical Exam   Psychiatric: Her speech is normal.        Neurologic Exam     Mental Status   Oriented to person.   Oriented to place.   Disoriented to time.   Registration: recalls 3 of 3 objects. Recall at 5 minutes: recalls 3 of 3 objects. Follows 3 step commands.   Attention: normal.   Speech: speech is normal   Level of consciousness: alert  Able to name object. Able to read. Able to repeat. Able to write. Normal comprehension.     Cranial Nerves   Cranial nerves II through XII intact.         Results  MMSE=26      Assessment/Plan   Daly was seen today for memory loss.    Diagnoses and all orders for this visit:    Cognitive impairment    Other orders  -     memantine (NAMENDA) 10 MG tablet; Take 1 tablet by mouth Daily.          Discussion/Summary   Daly Gauthier returns to clinic today with a history of cognitive impairment. While a vascular dementia as proposed by Dr. Urrutia at  is certainly possible, I remain concerned that depression is a significant contributor. At this time it was elected to restart Namenda, though planning to increase only to 10 mg daily. I also discussed the possibility of increasing donepezil to 10 mg bid or 23 mg daily in the future, along with potential risks and benefits. She will then follow up in 4 months, or sooner if needed.     I spent 25 minutes face to face with the patient and family. I spent 15 minutes counseling and discussing diagnosis, current status, treatment options and management.    As part of this visit I obtained additional history from the family which is incorporated in the HPI.      Jun Kennedy MD

## 2019-05-16 ENCOUNTER — TELEPHONE (OUTPATIENT)
Dept: NEUROLOGY | Facility: CLINIC | Age: 84
End: 2019-05-16

## 2019-05-16 NOTE — TELEPHONE ENCOUNTER
ALLA:     ANIL AQUINO - PT DAUGHTER - LEFT A VOICEMAIL SAYING SHE HAD SOME CONCERNS SHE NEEDED TO DISCUSS WITH ALLA AFTER A VISIT WITH PT'S PCP.   CALLBACK : [phone]

## 2019-05-16 NOTE — TELEPHONE ENCOUNTER
Attempted to call Alicia right away as Shante Alvarengan is booked out until June, although I was able to find a spot for this coming Monday @ 2:30 pm. I went ahead and threw Daly in there real fast before it got taken by someone else!    Left a VM for Alicia letting her know about this appointment change and to give me a call back either way, especially if this does not work for them.

## 2019-05-16 NOTE — TELEPHONE ENCOUNTER
Shante,  Daughter and POA, Alicia left a voicemail requesting a call back. Alicia  States they took Daly to primary care to see  as she has been shuffling when walking, having trouble swallowing and keeps a blank expression showing no emotion with her face. Pt believes 2 of her husbands live with her at home now and about every afternoon she shakes all over which they had previously thought to be anxiety so gave her a xanax in the past but it is occurring daily now.PCP is concerned and wonders if she has ever been evaluated for Parkinson's?    Alicia's Cb#[phone]

## 2019-05-17 ENCOUNTER — TELEPHONE (OUTPATIENT)
Dept: NEUROLOGY | Facility: CLINIC | Age: 84
End: 2019-05-17

## 2019-05-17 NOTE — TELEPHONE ENCOUNTER
Alicia Alvarez returned my call and left a VM stating they can make it on Monday and will be there for her new appt time @ 2:30. She is very appreciative we were able to fit her in for this earlier follow up.

## 2019-05-20 ENCOUNTER — OFFICE VISIT (OUTPATIENT)
Dept: NEUROLOGY | Facility: CLINIC | Age: 84
End: 2019-05-20

## 2019-05-20 VITALS
HEART RATE: 74 BPM | WEIGHT: 170 LBS | SYSTOLIC BLOOD PRESSURE: 150 MMHG | BODY MASS INDEX: 30.12 KG/M2 | HEIGHT: 63 IN | DIASTOLIC BLOOD PRESSURE: 97 MMHG

## 2019-05-20 DIAGNOSIS — R41.89 COGNITIVE IMPAIRMENT: Primary | ICD-10-CM

## 2019-05-20 PROCEDURE — 99215 OFFICE O/P EST HI 40 MIN: CPT | Performed by: PHYSICIAN ASSISTANT

## 2019-05-20 RX ORDER — QUETIAPINE FUMARATE 25 MG/1
25 TABLET, FILM COATED ORAL NIGHTLY
Qty: 30 TABLET | Refills: 11 | Status: SHIPPED | OUTPATIENT
Start: 2019-05-20 | End: 2020-05-11

## 2019-05-20 RX ORDER — AMOXICILLIN 250 MG/1
CAPSULE ORAL
Refills: 0 | COMMUNITY
Start: 2019-05-17 | End: 2019-05-27

## 2019-05-20 NOTE — PROGRESS NOTES
"Subjective     Chief Complaint: memory loss      History of Present Illness   Daly Gauthier is a 83 y.o. female who returns to clinic today with a difficult history of cognitive impairment. She and her family have noted a progressive cognitive decline since 2011 when she underwent chemotherapy and radiation for breast cancer. This has been marked over time by impairments in memory, orientation, language and executive function. There have also been associated symptoms of depression and apathy.      An MRI and screening bloodwork have been unrevealing. She has been treated with donepezil, and has been intolerant of Namenda due to dizziness and hallucinations at a dosage of 15 mg per day. She saw Dr. Urrutia at  in March, 2017 and was diagnosed with vascular dementia by report.      It is noted that she had a spell in 9/18 during which her family states that she seemed \"out of it\". This lasted for approximately 20 minutes. During the episode, her family noted that she stared ahead blankly. She is amnestic of the even, though her family notes that she was able to answer some questions during this period of time. Her family notes associated fatigue and confusion following the spell. Her family denies associated convulsions, urinary incontinence, or tongue biting. She has had 5-6 similar spells over the last 5-6 years. An EEG in 11/17 was unremarkable as well as a repeat EEG in 10/18.     Today: Since her last visit in 2/19, she and her family have noted a cognitive decline. She continues to be increasingly sedentary and endorses significant depression for which she is following with behavioral health. Her family has noted delusions. For example, she believes that there are two of her . Her family has also noted a shuffling gait. Additionally, she has diffuse tremors in the afternoon. It is unclear if this is worse with increased anxiety. Her PCP recently ordered home health for PT for her balance and SLP as she " "is having difficulty swallowing as well.       I have reviewed and confirmed the past family, social and medical history as accurate on 5/21/19.     Review of Systems   Constitutional: Negative.    HENT: Negative.    Eyes: Negative.    Respiratory: Negative.    Cardiovascular: Negative.    Gastrointestinal: Negative.    Endocrine: Negative.    Genitourinary: Negative.    Musculoskeletal: Negative.    Skin: Negative.    Allergic/Immunologic: Negative.    Neurological:        Memory loss     Hematological: Negative.    Psychiatric/Behavioral: Positive for dysphoric mood.       Objective     Ht 160 cm (62.99\")   Wt 77.1 kg (170 lb)   BMI 30.12 kg/m²     General appearance today is normal.       Physical Exam   Neurological: She has normal strength. She has a normal Finger-Nose-Finger Test.   Psychiatric: Her speech is normal.        Neurologic Exam     Mental Status   Oriented to person.   Oriented to place.   Disoriented to time. Disoriented to year, month, date, day and season.   Registration: recalls 3 of 3 objects. Recall at 5 minutes: recalls 3 of 3 objects. Follows 3 step commands.   Attention: decreased.   Speech: speech is normal   Level of consciousness: alert  Able to name object. Able to read. Able to repeat. Able to write. Normal comprehension.     Cranial Nerves   Cranial nerves II through XII intact.     Motor Exam   Muscle bulk: normal  Overall muscle tone: normal    Strength   Strength 5/5 throughout.     Sensory Exam   Light touch normal.     Gait, Coordination, and Reflexes     Gait  Gait: shuffling    Coordination   Finger to nose coordination: normal    Tremor   Resting tremor: absent  Intention tremor: absent        Results  MMSE=20      Assessment/Plan   Daly was seen today for memory loss.    Diagnoses and all orders for this visit:    Cognitive impairment    Other orders  -     QUEtiapine (SEROquel) 25 MG tablet; Take 1 tablet by mouth Every Night.          Discussion/Summary   Daly JOHN " Abrahan returns to clinic today for evaluation of cognitive impairment. While a vascular dementia as proposed by Dr. Urrutia at  is possible, we continue to be concerned about the contribution of depression, which I again discussed today. She may have some degree of parkinsonism, which could be related to vascular disease. Additionally, I am concerned that some of her medications (particularly Xanax) could be negatively affecting her cognition. This was discussed at length. After discussing potential treatment options, it was elected to add  Seroquel for her delusions and continue on donepezil and memantine unchanged. I encouraged her to work closely with home health. She will then follow up in 3 months, or sooner if needed.   I spent 40 minutes face to face with the patient and family with 25 minutes spent on discussing diagnosis, prognosis, diagnostic testing, evaluation, current status, treatment options and management as discussed above.       As part of this visit I obtained additional history from the family which is incorporated in the HPI.      Shante Lopez PA-C   WNL/finger to nose/heel to shin finger to nose/heel to shin/WNL

## 2019-07-08 RX ORDER — DONEPEZIL HYDROCHLORIDE 10 MG/1
TABLET, FILM COATED ORAL
Qty: 90 TABLET | Refills: 0 | Status: SHIPPED | OUTPATIENT
Start: 2019-07-08 | End: 2019-10-03 | Stop reason: SDUPTHER

## 2019-08-09 ENCOUNTER — OFFICE VISIT (OUTPATIENT)
Dept: NEUROLOGY | Facility: CLINIC | Age: 84
End: 2019-08-09

## 2019-08-09 DIAGNOSIS — R41.89 COGNITIVE IMPAIRMENT: Primary | ICD-10-CM

## 2019-08-09 PROCEDURE — 99214 OFFICE O/P EST MOD 30 MIN: CPT | Performed by: PSYCHIATRY & NEUROLOGY

## 2019-08-09 RX ORDER — FLUOXETINE HYDROCHLORIDE 20 MG/1
CAPSULE ORAL
Refills: 0 | COMMUNITY
Start: 2019-08-04 | End: 2022-05-13

## 2019-08-09 RX ORDER — LANOLIN ALCOHOL/MO/W.PET/CERES
1000 CREAM (GRAM) TOPICAL DAILY
COMMUNITY

## 2019-08-09 NOTE — PROGRESS NOTES
"Subjective     Chief Complaint: memory loss      History of Present Illness   Daly Gauthier is a 83 y.o. female who returns to clinic today with a difficult history of cognitive impairment. She and her family have noted a progressive cognitive decline since 2011 when she underwent chemotherapy and radiation for breast cancer. This has been marked over time by impairments in memory, orientation, language and executive function. There have also been associated symptoms of depression and apathy.      An MRI and screening bloodwork have been unrevealing. She has been treated with donepezil, and has been intolerant of Namenda due to dizziness and hallucinations at a dosage of 15 mg per day. She saw Dr. Urrutia at  in March, 2017 and was diagnosed with vascular dementia by report.      It is noted that she had a spell in 9/18 during which her family states that she seemed \"out of it\". This lasted for approximately 20 minutes. During the episode, her family noted that she stared ahead blankly. She is amnestic of the even, though her family notes that she was able to answer some questions during this period of time. Her family notes associated fatigue and confusion following the spell. Her family denies associated convulsions, urinary incontinence, or tongue biting. She has had 5-6 similar spells over the last 5-6 years. An EEG in 11/17 was unremarkable as well as a repeat EEG in 10/18.     Since her last visit on 5/21/19 her delusions persist, though may be better with the addition of Seroquel. Her sleep is improved on this medication. Her family has also noted mild hypoxia (O2 sat's around 90%), though there has apparently been no findings of cardiopulmonary disease.    I have reviewed and confirmed the past family, social and medical history as accurate on 8/9/19.     Review of Systems   Constitutional: Negative.        Objective     There were no vitals taken for this visit.    General appearance today is normal. "       Physical Exam   Neurological: She has normal strength.   Psychiatric: Her speech is normal.        Neurologic Exam     Mental Status   Oriented to person.   Disoriented to place.   Disoriented to time.   Speech: speech is normal   Level of consciousness: alert  Normal comprehension.     Cranial Nerves   Cranial nerves II through XII intact.     Motor Exam   Muscle bulk: normal  Overall muscle tone: normal    Strength   Strength 5/5 throughout.     Gait, Coordination, and Reflexes     Gait  Gait: (mildly shortened steps)        Results  MMSE=refuses testing today (20 on 5/21/19)      Assessment/Plan   Daly was seen today for memory loss.    Diagnoses and all orders for this visit:    Cognitive impairment          Discussion/Summary   Daly Gauthier returns to clinic today with a history of cognitive impairment. While a vascular dementia as proposed by Dr. Urrutia at  is possible, I have been concerned about underlying affective disease as a contributing factor as well. After discussing potential treatment options, it was elected to continue on donepezil and memantine unchanged. Her family will try decreasing her Seroquel to 12.5 mg. If daytime somnolence persists, we may switch to an alternative neuroleptic. She will then follow up in 3 months, or sooner if needed.     I spent 25 minutes face to face with the patient and family. I spent 15 minutes counseling and discussing current status, treatment options and management.    As part of this visit I obtained additional history from the family which is incorporated in the HPI.      Jun Kennedy MD

## 2019-10-03 ENCOUNTER — TELEPHONE (OUTPATIENT)
Dept: NEUROLOGY | Facility: CLINIC | Age: 84
End: 2019-10-03

## 2019-10-03 RX ORDER — DONEPEZIL HYDROCHLORIDE 10 MG/1
TABLET, FILM COATED ORAL
Qty: 90 TABLET | Refills: 0 | Status: SHIPPED | OUTPATIENT
Start: 2019-10-03 | End: 2020-01-15

## 2019-10-03 NOTE — TELEPHONE ENCOUNTER
Called and spoke with pt  Celso and informed that refill Rx has been sent into pt pharmacy. Celso stated understanding. Thanks.

## 2019-11-22 ENCOUNTER — OFFICE VISIT (OUTPATIENT)
Dept: NEUROLOGY | Facility: CLINIC | Age: 84
End: 2019-11-22

## 2019-11-22 VITALS — WEIGHT: 170 LBS | BODY MASS INDEX: 30.12 KG/M2 | HEART RATE: 64 BPM | HEIGHT: 63 IN | OXYGEN SATURATION: 92 %

## 2019-11-22 DIAGNOSIS — R41.89 COGNITIVE IMPAIRMENT: Primary | ICD-10-CM

## 2019-11-22 PROCEDURE — 99214 OFFICE O/P EST MOD 30 MIN: CPT | Performed by: PSYCHIATRY & NEUROLOGY

## 2019-11-22 RX ORDER — MECLIZINE HCL 12.5 MG/1
12.5 TABLET ORAL DAILY
Refills: 0 | COMMUNITY
Start: 2019-09-24 | End: 2019-11-22

## 2019-11-22 RX ORDER — DONEPEZIL HYDROCHLORIDE 23 MG/1
TABLET, FILM COATED ORAL
Refills: 0 | COMMUNITY
Start: 2019-11-06 | End: 2019-11-22

## 2019-11-22 NOTE — PROGRESS NOTES
"Subjective     Chief Complaint: memory loss       Daly Gauthier is a 84 y.o. female who returns to clinic today with a difficult history of cognitive impairment. She and her family have noted a progressive cognitive decline since 2011 when she underwent chemotherapy and radiation for breast cancer. This has been marked over time by impairments in memory, orientation, language and executive function. There have also been associated symptoms of depression and apathy.      An MRI and screening bloodwork have been unrevealing. She has been treated with donepezil, and has been intolerant of Namenda due to dizziness and hallucinations at a dosage of 15 mg per day. She saw Dr. Urrutia at  in March, 2017 and was diagnosed with vascular dementia by report. She has been intolerant of donepezil at 23 mg daily (due to GI upset, cramps).      It is noted that she had a spell in 9/18 during which her family states that she seemed \"out of it\". This lasted for approximately 20 minutes. During the episode, her family noted that she stared ahead blankly. She is amnestic of the even, though her family notes that she was able to answer some questions during this period of time. Her family notes associated fatigue and confusion following the spell. Her family denies associated convulsions, urinary incontinence, or tongue biting. She has had 5-6 similar spells over the last 5-6 years. An EEG in 11/17 was unremarkable as well as a repeat EEG in 10/18.     Since her last visit on 8/9/19 her donepezil was increased to 23 mg with resultant GI upset and leg cramps. She continues to have a gradual cognitive decline, though her family continues to note significant fluctuation. There are not clear exacerbating or alleviating factors. She continues to have periods of increased anxiety, though occurring somewhat sporadically about 3 weeks apart. She continues to follow with Dr. Zazueta in behavioral health.    I have reviewed and confirmed the " "past family, social and medical history as accurate on 11/22/19.     Review of Systems   Constitutional: Negative.        Objective     Pulse 64   Ht 160 cm (62.99\")   Wt 77.1 kg (170 lb)   SpO2 92%   BMI 30.12 kg/m²     General appearance today is normal.       Physical Exam   Neurological: She has normal strength.   Psychiatric: Her speech is normal.        Neurologic Exam     Mental Status   Oriented to person.   Disoriented to place.   Disoriented to time.   Registration: recalls 3 of 3 objects. Recall at 5 minutes: recalls 3 of 3 objects. Follows 3 step commands.   Attention: normal.   Speech: speech is normal   Level of consciousness: alert  Able to name object. Able to read. Able to repeat. Able to write. Normal comprehension.     Cranial Nerves   Cranial nerves II through XII intact.     Motor Exam   Muscle bulk: normal  Overall muscle tone: normal    Strength   Strength 5/5 throughout.     Sensory Exam   Light touch normal.         Results  MMSE=20 (also 20 on 5/21/19)      Assessment/Plan   Daly was seen today for follow-up and memory loss.    Diagnoses and all orders for this visit:    Cognitive impairment          Discussion/Summary   Daly Gauthier returns to clinic today with a history of cognitive impairment. While a vascular dementia as proposed by Dr. Urrutia at  is possible, I have been concerned about underlying affective disease as a contributing factor as well. After discussing potential treatment options, it was elected to reduce donepezil back to 10 mg daily and continue on memantine unchanged. I think it reasonable to follow Dr. Zazueta's advice in terms of treating anxiety spells. I did not make additional recommendations otherwise. She will then follow up in 3 months, or sooner if needed.     I spent 25 minutes face to face with the patient and family. I spent 15 minutes counseling and discussing current status, treatment options and management.    As part of this visit I obtained " additional history from the family which is incorporated in the HPI.      Jun Kennedy MD

## 2020-01-15 RX ORDER — DONEPEZIL HYDROCHLORIDE 10 MG/1
TABLET, FILM COATED ORAL
Qty: 90 TABLET | Refills: 1 | Status: SHIPPED | OUTPATIENT
Start: 2020-01-15 | End: 2020-07-20

## 2020-01-20 RX ORDER — MEMANTINE HYDROCHLORIDE 10 MG/1
TABLET ORAL
Qty: 90 TABLET | Refills: 2 | Status: SHIPPED | OUTPATIENT
Start: 2020-01-20 | End: 2021-01-19 | Stop reason: SDUPTHER

## 2020-03-03 ENCOUNTER — OFFICE VISIT (OUTPATIENT)
Dept: NEUROLOGY | Facility: CLINIC | Age: 85
End: 2020-03-03

## 2020-03-03 VITALS — HEIGHT: 63 IN | BODY MASS INDEX: 30.12 KG/M2 | WEIGHT: 169.97 LBS

## 2020-03-03 DIAGNOSIS — R41.89 COGNITIVE IMPAIRMENT: Primary | ICD-10-CM

## 2020-03-03 PROCEDURE — 99214 OFFICE O/P EST MOD 30 MIN: CPT | Performed by: PHYSICIAN ASSISTANT

## 2020-03-03 NOTE — PROGRESS NOTES
"Subjective     Chief Complaint: cognitive impairment      History of Present Illness   Daly Gauthier is a 84 y.o. female who returns to clinic today with a difficult history of cognitive impairment. She and her family have noted a progressive cognitive decline since 2011 when she underwent chemotherapy and radiation for breast cancer. This has been marked over time by impairments in memory, orientation, language and executive function. There have also been associated symptoms of depression and apathy.      An MRI and screening bloodwork have been unrevealing. She has been treated with donepezil, and has been intolerant of Namenda due to dizziness and hallucinations at a dosage of 15 mg per day. She saw Dr. Urrutia at  in March, 2017 and was diagnosed with vascular dementia by report. She has been intolerant of donepezil at 23 mg daily (due to GI upset, cramps).      It is noted that she had a spell in 9/18 during which her family states that she seemed \"out of it\". This lasted for approximately 20 minutes. During the episode, her family noted that she stared ahead blankly. She was amnestic of the event, though her family notes that she was able to answer some questions during this period of time. Her family notes associated fatigue and confusion following the spell. Her family denies associated convulsions, urinary incontinence, or tongue biting. She has had 5-6 similar spells over the last 5-6 years. An EEG in 11/17 was unremarkable as well as a repeat EEG in 10/18.     Today: Since her last visit in 11/19, she feels that her memory has worsened. Her family feels that she is essentially unchanged cognitively. She continues to follow with Dr. Zazueta in behavioral health and her anxiety is improved.       I have reviewed and confirmed the past family, social and medical history as accurate on 3/3/2020.     Review of Systems   Constitutional: Negative.    HENT: Negative.    Eyes: Negative.    Respiratory: " "Negative.    Cardiovascular: Negative.    Gastrointestinal: Negative.    Endocrine: Negative.    Genitourinary: Negative.    Musculoskeletal: Negative.    Skin: Negative.    Allergic/Immunologic: Negative.    Neurological: Positive for dizziness.        Cognitive impairment    Hematological: Negative.        Objective     Ht 160 cm (62.99\")   Wt 77.1 kg (169 lb 15.6 oz)   BMI 30.12 kg/m²     General appearance today is normal.       Physical Exam   Neurological: She has normal strength. She has a normal Finger-Nose-Finger Test.   Psychiatric: Her speech is normal.        Neurologic Exam     Mental Status   Oriented to person.   Oriented to place.   Disoriented to time. Oriented to season.   Registration: recalls 3 of 3 objects. Recall at 5 minutes: recalls 2 of 3 objects. Follows 3 step commands.   Attention: decreased.   Speech: speech is normal   Level of consciousness: alert  Able to name object. Able to read. Able to repeat. Able to write. Normal comprehension.     Cranial Nerves   Cranial nerves II through XII intact.     Motor Exam   Muscle bulk: normal  Overall muscle tone: normal    Strength   Strength 5/5 throughout.     Sensory Exam   Light touch normal.     Gait, Coordination, and Reflexes     Coordination   Finger to nose coordination: normal    Tremor   Resting tremor: absent        Results  MMSE=19      Assessment/Plan   Daly was seen today for memory loss.    Diagnoses and all orders for this visit:    Cognitive impairment          Discussion/Summary   Daly Gauthier returns to clinic today with a history of cognitive impairment. While a vascular dementia as proposed by Dr. Urrutia at  is possible, I have been concerned about underlying affective disease as a contributing factor as well. After discussing potential treatment options, it was elected to continue on  donepezil and memantine unchanged. I think it reasonable to follow Dr. Zazueta's advice in terms of treating anxiety spells. She " will then follow up in 6 months , or sooner if needed.   I spent 25 minutes face to face with the patient and family with 20 minutes spent on discussing diagnosis, prognosis, evaluation, current status, treatment options and management as discussed above.       As part of this visit I obtained additional history from the family which is incorporated in the HPI.      Shante Lopez PA-C

## 2020-05-11 RX ORDER — QUETIAPINE FUMARATE 25 MG/1
TABLET, FILM COATED ORAL
Qty: 90 TABLET | Refills: 3 | Status: SHIPPED | OUTPATIENT
Start: 2020-05-11 | End: 2021-10-22

## 2020-06-08 ENCOUNTER — TELEPHONE (OUTPATIENT)
Dept: NEUROLOGY | Facility: CLINIC | Age: 85
End: 2020-06-08

## 2020-06-08 NOTE — TELEPHONE ENCOUNTER
PT'S DAUGHTER VINNY AQUINO CALLED TO REQUEST A SOONER APPT FOR PT WITH DR. BEDOYA. HER DAUGHTER SAID THE PT WENT TO PCP THIS MORNING AND HER PCP TOLD VINNY SHE NEEDS TO BE SEEN WITHIN THE NEXT FEW WEEKS IF POSSIBLE AND IS ONLY TO BE SEEN BY DR. BEDOYA. VINNY SAID PT'S INDEX FINGER ON HER LEFT HAND HAS GONE COMPLETELY NUMB, THE PT HAS BEEN CONSISTENTLY CLOSING HER RIGHT EYE, SHE'S BEEN GETTING EXTREMELY DIZZY AND HAS FALLEN TWICE WITHIN THE LAST COUPLE OF WEEKS. VINNY SAID THEY HAD THE PT'S EYE CHECKED OUT AND WAS TOLD THEY DIDN'T SEE ANYTHING WRONG WITH IT. PLEASE ADVISE. PT IS ADDED TO THE WAIT LIST      BEST CALL BACK- [phone] VINNY SAID IT'S OK TO LEAVE A MESSAGE

## 2020-06-09 NOTE — TELEPHONE ENCOUNTER
Called and spoke to pt  Celso regarding message received and concerning needing pt to be seen sooner. Informed Celso of pt scheduled appt 6/12 and Celso stated verbal understanding and appreciation. Thanks.

## 2020-06-12 ENCOUNTER — OFFICE VISIT (OUTPATIENT)
Dept: NEUROLOGY | Facility: CLINIC | Age: 85
End: 2020-06-12

## 2020-06-12 VITALS
DIASTOLIC BLOOD PRESSURE: 72 MMHG | WEIGHT: 156 LBS | HEART RATE: 62 BPM | BODY MASS INDEX: 27.64 KG/M2 | SYSTOLIC BLOOD PRESSURE: 148 MMHG | OXYGEN SATURATION: 91 % | HEIGHT: 63 IN

## 2020-06-12 DIAGNOSIS — R42 DIZZINESS: ICD-10-CM

## 2020-06-12 DIAGNOSIS — R41.89 COGNITIVE IMPAIRMENT: Primary | ICD-10-CM

## 2020-06-12 DIAGNOSIS — R26.89 ABNORMALITY OF GAIT DUE TO IMPAIRMENT OF BALANCE: ICD-10-CM

## 2020-06-12 PROCEDURE — 99214 OFFICE O/P EST MOD 30 MIN: CPT | Performed by: PSYCHIATRY & NEUROLOGY

## 2020-06-12 RX ORDER — HYDROCHLOROTHIAZIDE 25 MG/1
25 TABLET ORAL DAILY
COMMUNITY
Start: 2020-05-26 | End: 2021-10-22

## 2020-06-12 RX ORDER — MECLIZINE HCL 12.5 MG/1
12.5 TABLET ORAL 2 TIMES DAILY
COMMUNITY
Start: 2020-05-17 | End: 2020-10-07

## 2020-06-12 NOTE — PROGRESS NOTES
"Subjective     Chief Complaint: cognitive impairment      History of Present Illness   Daly Gauthier is a 84 y.o. female who returns to clinic today with a difficult history of cognitive impairment. She and her family have noted a progressive cognitive decline since 2011 when she underwent chemotherapy and radiation for breast cancer. This has been marked over time by impairments in memory, orientation, language and executive function. There have also been associated symptoms of depression and apathy.      An MRI and screening bloodwork have been unrevealing. She has been treated with donepezil, and has been intolerant of Namenda due to dizziness and hallucinations at a dosage of 15 mg per day. She saw Dr. Urrutia at  in March, 2017 and was diagnosed with vascular dementia by report. She has been intolerant of donepezil at 23 mg daily (due to GI upset, cramps).      It is noted that she had a spell in 9/18 during which her family states that she seemed \"out of it\". This lasted for approximately 20 minutes. During the episode, her family noted that she stared ahead blankly. She was amnestic of the event, though her family notes that she was able to answer some questions during this period of time. Her family notes associated fatigue and confusion following the spell. Her family denies associated convulsions, urinary incontinence, or tongue biting. She has had 5-6 similar spells over the last 5-6 years. An EEG in 11/17 was unremarkable as well as a repeat EEG in 10/18.     Since her last visit on 3/3/20 she has had significant dizziness (last week), L>R index finger numbness (longer term), worsening memory, and a tendency to close the right eye (since about 3/20). She continues to follow with Dr. Zazueta in behavioral health and has recently increased her alprazolam dosing.    I have reviewed and confirmed the past family, social and medical history as accurate on 6/12/2020.     Review of Systems   Constitutional: " "Negative.        Objective     /72   Pulse 62   Ht 160 cm (62.99\")   Wt 70.8 kg (156 lb)   SpO2 91%   BMI 27.64 kg/m²     General appearance today is normal.       Physical Exam   Neurological: She has normal strength. She has a normal Finger-Nose-Finger Test.   Psychiatric: Her speech is normal.        Neurologic Exam     Mental Status   Oriented to person.   Disoriented to place.   Disoriented to time. Oriented to season.   Registration: recalls 3 of 3 objects. Recall at 5 minutes: recalls 2 of 3 objects. Follows 3 step commands.   Attention: normal.   Speech: speech is normal   Level of consciousness: alert  Able to name object. Able to read. Able to repeat. Able to write. Normal comprehension.     Cranial Nerves   Cranial nerves II through XII intact.     Motor Exam   Muscle bulk: normal  Overall muscle tone: normal    Strength   Strength 5/5 throughout.     Sensory Exam   Light touch normal.   Pinprick normal.     Gait, Coordination, and Reflexes     Gait  Gait: (cautious)    Coordination   Finger to nose coordination: normal        Results  MMSE=16      Assessment/Plan   Daly was seen today for memory loss and follow-up.    Diagnoses and all orders for this visit:    Cognitive impairment    Abnormality of gait due to impairment of balance    Dizziness          Discussion/Summary   Daly Gauthier returns to clinic today with a history of cognitive impairment. While a vascular dementia as proposed by Dr. Urrutia at  is possible, I have been concerned about underlying affective disease as a contributing factor as well. In terms of her dizziness, I suspect I will not be able to explain this symptom, though worry it could be a symptom of anxiety. I also discussed the potential for the role of medications. In terms of her finger numbness, I suspect carpal tunnel syndrome, which I discussed and offered referral for EMG studies. For her dizziness and falls, I have offered referral to PT (through [facility]). I have also offered to repeat her neuroimaging. I'm unable to find a cranial nerve abnormality (and her ophthalmological examination was reportedly normal), and so I suspect any eye problems will be unexplained for now. She will then follow up in 6 months, or sooner if needed.     I spent 30  minutes face to face with the patient and family. I spent 20 minutes counseling and discussing diagnosis, diagnostic testing, current status, treatment options and management.    As part of this visit I obtained additional history from the family which is incorporated in the HPI.      Jun Kennedy MD

## 2020-07-20 RX ORDER — DONEPEZIL HYDROCHLORIDE 10 MG/1
TABLET, FILM COATED ORAL
Qty: 90 TABLET | Refills: 1 | Status: SHIPPED | OUTPATIENT
Start: 2020-07-20 | End: 2021-01-19 | Stop reason: SDUPTHER

## 2020-10-07 ENCOUNTER — OFFICE VISIT (OUTPATIENT)
Dept: NEUROLOGY | Facility: CLINIC | Age: 85
End: 2020-10-07

## 2020-10-07 VITALS — HEIGHT: 63 IN | TEMPERATURE: 97.3 F | BODY MASS INDEX: 27.64 KG/M2

## 2020-10-07 DIAGNOSIS — R41.89 COGNITIVE IMPAIRMENT: Primary | ICD-10-CM

## 2020-10-07 PROCEDURE — 99214 OFFICE O/P EST MOD 30 MIN: CPT | Performed by: PSYCHIATRY & NEUROLOGY

## 2020-10-07 RX ORDER — LORATADINE 10 MG/1
CAPSULE, LIQUID FILLED ORAL
COMMUNITY
End: 2021-10-22

## 2020-10-07 NOTE — PROGRESS NOTES
"Subjective     Chief Complaint: cognitive impairment      History of Present Illness   Daly Gauthier is a 84 y.o. female who returns to clinic today with a difficult history of cognitive impairment. She and her family have noted a progressive cognitive decline since 2011 when she underwent chemotherapy and radiation for breast cancer. This has been marked over time by impairments in memory, orientation, language and executive function. There have also been associated symptoms of depression and apathy.      An MRI and screening bloodwork have been unrevealing. She has been treated with donepezil, and has been intolerant of Namenda due to dizziness and hallucinations at a dosage of 15 mg per day. She saw Dr. Urrutia at  in March, 2017 and was diagnosed with vascular dementia by report. She has been intolerant of donepezil at 23 mg daily (due to GI upset, cramps).      It is noted that she had a spell in 9/18 during which her family states that she seemed \"out of it\". This lasted for approximately 20 minutes. During the episode, her family noted that she stared ahead blankly. She was amnestic of the event, though her family notes that she was able to answer some questions during this period of time. Her family notes associated fatigue and confusion following the spell. Her family denies associated convulsions, urinary incontinence, or tongue biting. She has had 5-6 similar spells over the last 5-6 years. An EEG in 11/17 was unremarkable as well as a repeat EEG in 10/18.     Since her last visit on 6/12/20 she has had worked with PT. However, this was hampered at times by anxiety, which has improved with the addition of Abilify. The family reports periods of orthostasis, though this seems to have resolved. She continues to complain of dizziness. Her cognition continues to fluctuate, though on same days is better than in the past.    I have reviewed and confirmed the past family, social and medical history as accurate " "on 10/7/20.     Review of Systems   Constitutional: Negative.        Objective     Temp 97.3 °F (36.3 °C)   Ht 160 cm (62.99\")   BMI 27.64 kg/m²     General appearance today is normal.       Physical Exam   Neurological: She has normal strength.   Psychiatric: Her speech is normal.        Neurologic Exam     Mental Status   Oriented to person.   Disoriented to place.   Disoriented to time.   Registration: recalls 3 of 3 objects. Recall at 5 minutes: recalls 3 of 3 objects. Follows 3 step commands.   Attention: normal.   Speech: speech is normal   Level of consciousness: alert  Able to name object. Able to read. Able to repeat. Able to write. Normal comprehension.     Cranial Nerves   Cranial nerves II through XII intact.     Motor Exam   Muscle bulk: normal  Overall muscle tone: normal    Strength   Strength 5/5 throughout.     Sensory Exam   Pinprick normal.     Gait, Coordination, and Reflexes     Gait  Gait: (cautious)        Results  MMSE=24 (16 on 6/12/20)      Assessment/Plan   Daly was seen today for cognitive impairment.    Diagnoses and all orders for this visit:    Cognitive impairment          Discussion/Summary   Daly Gauthier returns to clinic today with a history of cognitive impairment. While a vascular dementia as proposed by Dr. Urrutia at  is possible, I I continue to be concerned about underlying affective disease as a contributing factor as well. In terms of her dizziness, I suspect I will not be able to explain this symptom, though worry it could be a symptom of anxiety. I raised the possibility of stopping memantine to see if this could help with her dizziness. I did not have additional recommendations at this time. She will then follow up in 6 months, or sooner if needed.     I spent 25 minutes face to face with the patient and family. I spent 20 minutes counseling and discussing current status, treatment options and management.    As part of this visit I obtained additional history " from the family which is incorporated in the HPI.      Jun Kennedy MD

## 2021-01-20 RX ORDER — MEMANTINE HYDROCHLORIDE 10 MG/1
10 TABLET ORAL DAILY
Qty: 90 TABLET | Refills: 2 | Status: SHIPPED | OUTPATIENT
Start: 2021-01-20 | End: 2022-02-14

## 2021-01-20 RX ORDER — DONEPEZIL HYDROCHLORIDE 10 MG/1
10 TABLET, FILM COATED ORAL DAILY
Qty: 90 TABLET | Refills: 1 | Status: SHIPPED | OUTPATIENT
Start: 2021-01-20 | End: 2021-08-23

## 2021-04-14 ENCOUNTER — OFFICE VISIT (OUTPATIENT)
Dept: NEUROLOGY | Facility: CLINIC | Age: 86
End: 2021-04-14

## 2021-04-14 VITALS
DIASTOLIC BLOOD PRESSURE: 101 MMHG | HEART RATE: 74 BPM | BODY MASS INDEX: 26.58 KG/M2 | OXYGEN SATURATION: 93 % | TEMPERATURE: 97.1 F | SYSTOLIC BLOOD PRESSURE: 170 MMHG | HEIGHT: 63 IN | WEIGHT: 150 LBS

## 2021-04-14 DIAGNOSIS — R41.89 COGNITIVE IMPAIRMENT: Primary | ICD-10-CM

## 2021-04-14 PROCEDURE — 99214 OFFICE O/P EST MOD 30 MIN: CPT | Performed by: PHYSICIAN ASSISTANT

## 2021-04-14 RX ORDER — BLOOD SUGAR DIAGNOSTIC
STRIP MISCELLANEOUS DAILY
COMMUNITY
Start: 2021-02-24 | End: 2022-05-13

## 2021-04-14 RX ORDER — ARIPIPRAZOLE 5 MG/1
5 TABLET ORAL DAILY
COMMUNITY
End: 2021-10-22

## 2021-04-14 RX ORDER — LANCETS
EACH MISCELLANEOUS DAILY
COMMUNITY
Start: 2021-02-24

## 2021-04-14 RX ORDER — ARIPIPRAZOLE 5 MG/1
5 TABLET ORAL DAILY
COMMUNITY
Start: 2021-03-14 | End: 2021-04-14 | Stop reason: SDUPTHER

## 2021-08-23 RX ORDER — DONEPEZIL HYDROCHLORIDE 10 MG/1
10 TABLET, FILM COATED ORAL DAILY
Qty: 90 TABLET | Refills: 1 | Status: SHIPPED | OUTPATIENT
Start: 2021-08-23 | End: 2022-02-14

## 2021-10-22 ENCOUNTER — OFFICE VISIT (OUTPATIENT)
Dept: NEUROLOGY | Facility: CLINIC | Age: 86
End: 2021-10-22

## 2021-10-22 VITALS
DIASTOLIC BLOOD PRESSURE: 70 MMHG | WEIGHT: 150 LBS | BODY MASS INDEX: 26.58 KG/M2 | OXYGEN SATURATION: 96 % | SYSTOLIC BLOOD PRESSURE: 130 MMHG | HEIGHT: 63 IN | HEART RATE: 41 BPM

## 2021-10-22 DIAGNOSIS — R41.89 COGNITIVE IMPAIRMENT: Primary | ICD-10-CM

## 2021-10-22 PROCEDURE — 99214 OFFICE O/P EST MOD 30 MIN: CPT | Performed by: PHYSICIAN ASSISTANT

## 2021-10-22 NOTE — PROGRESS NOTES
"Subjective         Chief Complaint: memory loss      History of Present Illness   Daly Gauthier is a 86 y.o. female who returns to clinic today with a difficult history of cognitive impairment. She and her family have noted a progressive cognitive decline since 2011 when she underwent chemotherapy and radiation for breast cancer. This has been marked over time by impairments in memory, orientation, language and executive function. There have also been associated symptoms of depression and apathy.      An MRI and screening bloodwork have been unrevealing. She is currently taking donepezil 10mg daily and memantine 10mg daily. She was intolerant of higher doses of both medications. She saw Dr. Urrutia at  in March, 2017 and was diagnosed with vascular dementia by report.      It is noted that she had a spell in 9/18 during which her family states that she seemed \"out of it\". This lasted for approximately 20 minutes. During the episode, her family noted that she stared ahead blankly. She was amnestic of the event, though her family notes that she was able to answer some questions during this period of time. Her family notes associated fatigue and confusion following the spell. Her family denies associated convulsions, urinary incontinence, or tongue biting. She has had 5-6 similar spells over the last 5-6 years. An EEG in 11/17 was unremarkable as well as a repeat EEG in 10/18.     Today: Since her last visit in 4/21, she and her family have noted a cognitive decline. She was hospitalized over the summer for a UTI, kidney stones, CHF and a compression fracture sustained during a fall. She is now working closely with PT and OT through home health.        I have reviewed and confirmed the past family, social and medical history as accurate on 10/22/21.     Review of Systems   Constitutional: Negative.    HENT: Negative.    Eyes: Negative.    Respiratory: Negative.    Cardiovascular: Negative.    Gastrointestinal: " "Negative.    Endocrine: Negative.    Genitourinary: Negative.    Musculoskeletal: Negative.    Skin: Negative.    Allergic/Immunologic: Negative.    Hematological: Negative.        Objective     /70   Pulse (!) 41   Ht 160 cm (62.99\")   Wt 68 kg (150 lb)   SpO2 96%   BMI 26.58 kg/m²     General appearance today is normal.       Physical Exam  Neurological:      Coordination: Finger-Nose-Finger Test normal.      Deep Tendon Reflexes: Strength normal.   Psychiatric:         Speech: Speech normal.          Neurologic Exam     Mental Status   Oriented to person.   Disoriented to place.   Disoriented to time.   Attention: decreased.   Speech: speech is normal   Level of consciousness: alert  Normal comprehension.     Cranial Nerves   Cranial nerves II through XII intact.     Motor Exam   Muscle bulk: normal  Overall muscle tone: normal    Strength   Strength 5/5 throughout.     Gait, Coordination, and Reflexes     Gait  Gait: (in wheelchair)    Coordination   Finger to nose coordination: normal    Tremor   Resting tremor: absent        Results  MMSE=untestable (19 in 4/21)       Assessment/Plan   Diagnoses and all orders for this visit:    1. Cognitive impairment (Primary)          Discussion/Summary   Daly Gauthier returns to clinic today with a history of suspected dementia. I again reviewed her current status and treatment options. After discussing potential treatment options, it was elected to continue on  donepezil and memantine unchanged. She will also continue working with PT and OT through home health. She will then follow up in 6 months , or sooner if needed.   Total time of visit today: 30 minutes. As part of this visit I reviewed outside records and obtained additional history from the family which is incorporated in the HPI. I also discussed evaluation, current status, treatment options and management as discussed above.              Shante Lopez PA-C  "

## 2022-02-14 RX ORDER — MEMANTINE HYDROCHLORIDE 10 MG/1
10 TABLET ORAL DAILY
Qty: 90 TABLET | Refills: 2 | Status: SHIPPED | OUTPATIENT
Start: 2022-02-14 | End: 2022-11-21

## 2022-02-14 RX ORDER — DONEPEZIL HYDROCHLORIDE 10 MG/1
10 TABLET, FILM COATED ORAL DAILY
Qty: 90 TABLET | Refills: 1 | Status: SHIPPED | OUTPATIENT
Start: 2022-02-14 | End: 2022-08-15

## 2022-04-27 ENCOUNTER — TELEPHONE (OUTPATIENT)
Dept: NEUROLOGY | Facility: CLINIC | Age: 87
End: 2022-04-27

## 2022-04-27 NOTE — TELEPHONE ENCOUNTER
Called and spoke to Pt's daughter Alicia. We got her RS for the 5/13 apt I had offered to Celso earlier today.

## 2022-04-27 NOTE — TELEPHONE ENCOUNTER
Provider: GLEN  Caller: JUANITO NOLEN  Relationship to Patient:   Pharmacy: N/A  Phone Number: [phone]  Reason for Call: SPOUSE RETURNED CALL TO R/S APPOINTMENT DUE TO PROVIDER BEING OUT; HE WAS TOLD THAT THERE IS AN OPENING ON 5/13, BUT I AM NOT ABLE TO SCHEDULE FOR THAT DATE.    PLEASE REVIEW & ADVISE WHEN WE CAN GET PATIENT RE-SCHEDULED.    THANK YOU.

## 2022-05-13 ENCOUNTER — OFFICE VISIT (OUTPATIENT)
Dept: NEUROLOGY | Facility: CLINIC | Age: 87
End: 2022-05-13

## 2022-05-13 VITALS
HEART RATE: 68 BPM | DIASTOLIC BLOOD PRESSURE: 68 MMHG | OXYGEN SATURATION: 96 % | HEIGHT: 63 IN | SYSTOLIC BLOOD PRESSURE: 120 MMHG | BODY MASS INDEX: 26.58 KG/M2

## 2022-05-13 DIAGNOSIS — R41.89 COGNITIVE IMPAIRMENT: Primary | ICD-10-CM

## 2022-05-13 PROCEDURE — 99215 OFFICE O/P EST HI 40 MIN: CPT | Performed by: PHYSICIAN ASSISTANT

## 2022-05-13 NOTE — PROGRESS NOTES
"Subjective       Chief Complaint: memory loss      History of Present Illness   Daly Gauthier is a 86 y.o. female who returns to clinic today with a difficult history of cognitive impairment. She and her family have noted a progressive cognitive decline since 2011 when she underwent chemotherapy and radiation for breast cancer. This has been marked over time by impairments in memory, orientation, language and executive function. There have also been associated symptoms of depression and apathy.      An MRI and screening bloodwork have been unrevealing. She is currently taking donepezil 10mg daily and memantine 10mg daily. She was intolerant of higher doses of both medications. She saw Dr. Urrutia at  in March, 2017 and was diagnosed with vascular dementia by report.      It is noted that she had a spell in 9/18 during which her family states that she seemed \"out of it\". This lasted for approximately 20 minutes. During the episode, her family noted that she stared ahead blankly. She was amnestic of the event, though her family notes that she was able to answer some questions during this period of time. Her family notes associated fatigue and confusion following the spell. Her family denies associated convulsions, urinary incontinence, or tongue biting. She has had 5-6 similar spells over the last 5-6 years. An EEG in 11/17 was unremarkable as well as a repeat EEG in 10/18.     Today: Since her last visit in 10/21, she feels that her memory has potentially worsened. Her family notes that her cognition has improved. She wakes up often at night. She is scheduled to restart PT in the near future.      I have reviewed and confirmed the past family, social and medical history as accurate on 5/13/22.     Review of Systems   Constitutional: Negative.    HENT: Negative.    Eyes: Negative.    Respiratory: Negative.    Cardiovascular: Negative.    Gastrointestinal: Negative.    Endocrine: Negative.    Genitourinary: Negative. " "   Musculoskeletal: Negative.    Skin: Negative.    Allergic/Immunologic: Negative.    Hematological: Negative.        Objective     /68   Pulse 68   Ht 160 cm (62.99\")   SpO2 96%   BMI 26.58 kg/m²     General appearance today is normal.       Physical Exam  Neurological:      Coordination: Finger-Nose-Finger Test normal.      Deep Tendon Reflexes: Strength normal.   Psychiatric:         Speech: Speech normal.          Neurologic Exam     Mental Status   Oriented to person.   Oriented to place.   Oriented to time. Disoriented to date.   Registration: recalls 3 of 3 objects. Recall at 5 minutes: recalls 2 of 3 objects. Follows 3 step commands.   Attention: decreased.   Speech: speech is normal   Level of consciousness: alert  Able to name object. Able to read. Unable to repeat. Able to write. Normal comprehension.     Cranial Nerves   Cranial nerves II through XII intact.     Motor Exam   Muscle bulk: normal  Overall muscle tone: normal    Strength   Strength 5/5 throughout.     Gait, Coordination, and Reflexes     Gait  Gait: (somewhat unsteady)    Coordination   Finger to nose coordination: normal    Tremor   Resting tremor: absent        Results  MMSE=19      Assessment & Plan   Diagnoses and all orders for this visit:    1. Cognitive impairment (Primary)          Discussion/Summary   Daly Gauthier returns to clinic today with a history of suspected dementia. I again reviewed her current status and treatment options. After discussing potential treatment options, it was elected to continue on her current medications unchanged as she is doing well overall. We could consider adding mirtazapine for her sleep in the near future. She will then follow up in 6 months , or sooner if needed.   Total time of visit today: 40 minutes. As part of this visit I obtained additional history from the family which is incorporated in the HPI. I also discussed diagnosis, prognosis, diagnostic testing, evaluation, current " status, treatment options and management as discussed above.         Shante Lopez PA-C

## 2022-07-13 ENCOUNTER — TELEPHONE (OUTPATIENT)
Dept: NEUROLOGY | Facility: CLINIC | Age: 87
End: 2022-07-13

## 2022-07-13 NOTE — TELEPHONE ENCOUNTER
Provider: ALLA CARROLL PA-C    Caller: JUANITO    Relationship to Patient: SPOUSE    Phone Number: [phone]      Reason for Call: STATED IT SEEMS LIKE PATIENT IS TAKING TOO MUCH MEDICATION.  STATES PATIENT IS UNCOORDINATED, GROGGY, TROUBLE WALKING, CAN'T GET HER EYES OPENING IN THE MORNING.  STATED WEARS OFF AFTER LUNCH USUALLY.         When did it start: THREE WEEKS    PLEASE CALL AND ADVISE

## 2022-07-13 NOTE — TELEPHONE ENCOUNTER
I do not see anything on her medication list that should be sedating. I would recommend following up with her PCP to make rule out any other potential underlying medical causes. Thanks

## 2022-07-14 NOTE — TELEPHONE ENCOUNTER
Spoke with patient's  Celso. He stated the PCP told him to check with neurology. She didn't see anything that would be sedating to patient either. Patient does have appointment with behavior specialist.

## 2022-08-15 RX ORDER — DONEPEZIL HYDROCHLORIDE 10 MG/1
10 TABLET, FILM COATED ORAL DAILY
Qty: 90 TABLET | Refills: 1 | Status: SHIPPED | OUTPATIENT
Start: 2022-08-15 | End: 2022-11-21 | Stop reason: SDUPTHER

## 2022-11-21 ENCOUNTER — OFFICE VISIT (OUTPATIENT)
Dept: NEUROLOGY | Facility: CLINIC | Age: 87
End: 2022-11-21

## 2022-11-21 DIAGNOSIS — F03.90 DEMENTIA WITHOUT BEHAVIORAL DISTURBANCE: Primary | ICD-10-CM

## 2022-11-21 PROCEDURE — 99215 OFFICE O/P EST HI 40 MIN: CPT | Performed by: PHYSICIAN ASSISTANT

## 2022-11-21 RX ORDER — MEMANTINE HYDROCHLORIDE 10 MG/1
10 TABLET ORAL DAILY
Qty: 90 TABLET | Refills: 2 | Status: SHIPPED | OUTPATIENT
Start: 2022-11-21 | End: 2022-11-21 | Stop reason: SDUPTHER

## 2022-11-21 RX ORDER — BACLOFEN 10 MG/1
10 TABLET ORAL 3 TIMES DAILY
COMMUNITY

## 2022-11-21 RX ORDER — L.ACID,CASEI/B.ANIMAL/S.THERMO 16B CELL
1 CAPSULE ORAL DAILY
COMMUNITY

## 2022-11-21 RX ORDER — MEMANTINE HYDROCHLORIDE 10 MG/1
10 TABLET ORAL DAILY
Qty: 90 TABLET | Refills: 3 | Status: SHIPPED | OUTPATIENT
Start: 2022-11-21

## 2022-11-21 RX ORDER — FUROSEMIDE 20 MG/1
20 TABLET ORAL 2 TIMES DAILY
COMMUNITY

## 2022-11-21 RX ORDER — DONEPEZIL HYDROCHLORIDE 10 MG/1
10 TABLET, FILM COATED ORAL DAILY
Qty: 90 TABLET | Refills: 3 | Status: SHIPPED | OUTPATIENT
Start: 2022-11-21

## 2022-11-21 RX ORDER — ALPRAZOLAM 0.25 MG/1
0.25 TABLET ORAL 2 TIMES DAILY PRN
COMMUNITY

## 2022-11-21 RX ORDER — FLUOXETINE HYDROCHLORIDE 20 MG/1
40 CAPSULE ORAL DAILY
COMMUNITY

## 2022-11-21 NOTE — PROGRESS NOTES
"Subjective       Chief Complaint: memory loss      History of Present Illness   Daly Gauthier is a 87 y.o. female who returns to clinic today with a difficult history of cognitive impairment. She and her family have noted a progressive cognitive decline since 2011 when she underwent chemotherapy and radiation for breast cancer. This has been marked over time by impairments in memory, orientation, language and executive function. There have also been associated symptoms of depression and apathy.      An MRI and screening bloodwork have been unrevealing. She is currently taking donepezil 10mg daily and memantine 10mg daily. She was intolerant of higher doses of both medications. She saw Dr. Urrutia at  in March, 2017 and was diagnosed with vascular dementia by report.      It is noted that she had a spell in 9/18 during which her family states that she seemed \"out of it\". This lasted for approximately 20 minutes. During the episode, her family noted that she stared ahead blankly. She was amnestic of the event, though her family notes that she was able to answer some questions during this period of time. Her family notes associated fatigue and confusion following the spell. Her family denies associated convulsions, urinary incontinence, or tongue biting. She has had 5-6 similar spells over the last 5-6 years. An EEG in 11/17 was unremarkable as well as a repeat EEG in 10/18.     Today: Since her last visit in 5/22, she feels that her memory has potentially worsened and her family agrees. She often wakes up in the middle of the night. She has had increased balance impairment. She is participating closely with PT.        I have reviewed and confirmed the past family, social and medical history as accurate on 11/21/22.     Review of Systems   Constitutional: Negative.    HENT: Negative.    Eyes: Negative.    Respiratory: Negative.    Cardiovascular: Negative.    Gastrointestinal: Negative.    Endocrine: Negative.  "   Genitourinary: Negative.    Musculoskeletal: Negative.    Skin: Negative.    Allergic/Immunologic: Negative.    Hematological: Negative.        Objective       General appearance today is normal.     Physical Exam  Neurological:      Cranial Nerves: Cranial nerves 2-12 are intact.      Motor: Motor strength is normal.      Coordination: Finger-Nose-Finger Test normal.   Psychiatric:         Speech: Speech normal.          Neurologic Exam     Mental Status   Oriented to person.   Oriented to place.   Disoriented to time.   Registration: recalls 3 of 3 objects. Recall at 5 minutes: recalls 3 of 3 objects. Follows 3 step commands.   Attention: decreased.   Speech: speech is normal   Level of consciousness: alert  Able to name object. Able to read. Able to repeat. Unable to write. Normal comprehension.     Cranial Nerves   Cranial nerves II through XII intact.     Motor Exam   Muscle bulk: normal  Overall muscle tone: normal    Strength   Strength 5/5 throughout.     Gait, Coordination, and Reflexes     Gait  Gait: (slightlu unsteady)    Coordination   Finger to nose coordination: normal    Tremor   Resting tremor: absent        Results  MMSE=20      Assessment & Plan   Diagnoses and all orders for this visit:    1. Dementia without behavioral disturbance (HCC) (Primary)    Other orders  -     donepezil (ARICEPT) 10 MG tablet; Take 1 tablet by mouth Daily.  Dispense: 90 tablet; Refill: 3  -     memantine (NAMENDA) 10 MG tablet; Take 1 tablet by mouth Daily.  Dispense: 90 tablet; Refill: 3          Discussion/Summary   Daly Gauthier returns to clinic today for evaluation of suspected Vascular Dementia , though I am concerned that some of her medications (particularly baclofen and xanax) are negatively affecting her cognition. I again reviewed her current status and treatment options. After discussing potential treatment options, it was elected to continue on donepezil and memantine unchanged. We discussed potentially  adding mirtazapine in the hopes of eventually discontinuing xanax, which they will discuss further with behavioral health. She will then follow up in 4-6 months , or sooner if needed.   Total time of visit today: 40 minutes. As part of this visit I obtained additional history from the family which is incorporated in the HPI. I also discussed diagnosis, diagnostic testing, evaluation, current status, treatment options and management as discussed above.         Shante Lopez PA-C

## 2023-02-01 NOTE — THERAPY PROGRESS REPORT/RE-CERT
Called patient to discuss.  She is having issues with increased neuropathy intermittently.  She takes gabapentin 100 mg QAM and 300 mg QPM.  She denies any pain today.  She states she will discuss with Dr. Rich during her appointment next week.  Advised patient to call back if it gets worse before her appointment.  Verbalized understanding.     Outpatient Speech Language Pathology   Adult Speech Language Cognitive Progress Note  [facility]     Patient Name: Daly Gauthier  : 1935  MRN: [id number]  Today's Date: 2018         Visit Date: 2018   Patient Active Problem List   Diagnosis   • Mild cognitive impairment   • History of right breast cancer   • Cognitive impairment          Visit Dx:    ICD-10-CM ICD-9-CM   1. Mild cognitive impairment G31.84 331.83   2. Cognitive impairment R41.89 294.9                               SLP OP Goals     Row Name 18 1000          Goal Type Needed    Goal Type Needed Memory;Other Adult Goals  -HG        Subjective Comments    Subjective Comments Pt alert, cooperative, accompanied with her .   -HG        Memory Goals    Patient will be able to remember  information needed to participate in activities of daily living at least 2-3 a day with min verbal and visual cues.  -HG     Status: Patient will be able to remember  information needed to participate in activities of daily living Progressing as expected  -HG     Comments: Patient will be able to remember  information needed to participate in activities of daily living 18: Pt partially completed on her own and SLP guided the entry for this date in order to provide a model for upcoming days. 18: Incorporated the use of a daily journal during session in order to improve recall of immediate and recent information- pt required max cues in order to complete the task.   -HG     Patient’s memory skills will be enhanced as reported by patient by using external memory aides 80%:;with cues  -HG     Status: Patient’s memory skills will be enhanced as reported by patient by using external memory aides Progressing as expected  -HG     Comments: Patient’s memory skills will be enhanced as reported by patient by using external memory aides 18: Pt states she doesn't enjoy writing in it but she does it anyway. She feels that her life is boring.  7/13/18: Started the use of a daily journal this date and provided an outline of what information could be filled in each day.   -HG     Patient will demonstrate improved ability to recall information by listening to paragraph and answering yes/no questions 80%:;with cues  -HG     Status: Patient will demonstrate improved ability to recall information by listening to paragraph and answering yes/no questions New;Progressing as expected  -HG     Comments: Patient will demonstrate improved ability to recall information by listening to paragraph and answering yes/no questions 8/6/18: Name and face association: pt could not recall indepdendenty but with cues, given choices,pt was 60% accurate. 7/27/18: 22-36 word paragraphs: pt was 60% accurate.   -HG     Patient will demonstrate improved ability to recall information by stating activities patient has completed that day 80%:;with cues  -HG     Status: Patient will demonstrate improved ability to recall information by stating activities patient has completed that day Progressing as expected  -HG     Comments: Patient will demonstrate improved ability to recall information by stating activities patient has completed that day 8/6/18: Pt is writing daily with prompts from her . 7/13/18: Introduced use of journal during session in order to record all daily activites.   -HG        Other Goals    Other Adult Goal- 1 Pt will complete divergent and convergent naming with 80% accuracy with min cues.  -HG     Status: Other Adult Goal- 1 Progressing as expected  -HG     Comments: Other Adult Goal- 1 8/6/18: Divergent naming: pt was 80% accurate.  7/27/18: Spelling words that are cut in half; matching first half to last half with a 50% accurayc with mod cues.  Thought organization homework: pt was 75% accurate with cues needed at home.  7/13/18: Convergent naming: pt was 95% accurate.   -HG     Other Adult Goal- 2 Pt will complete reasoning tasks with 80% accuracy.   -HG      Status: Other Adult Goal- 2 Progressing as expected  -     Comments: Other Adult Goal- 2 8/6/18: 4 step sequencing: pt was 100% accurate; written directions: pt was 25% accurate. 7/27/18: For 4 step sequencing, pt was 80% accurate with mod cues. 7/13/18: Play unfamiliar game of [employer] and pt required max cues in order to participate.   -     Other Adult Goal- 3 Pt will improve SLUMS score to at least a 20 falling in the MNCD range.   -        SLP Time Calculation    SLP Goal Re-Cert Due Date 09/05/18  -       User Key  (r) = Recorded By, (t) = Taken By, (c) = Cosigned By    Initials Name Provider Type    Diana Xie MS CCC-SLP Speech and Language Pathologist                OP SLP Education     Row Name 08/06/18 1000       Education    Barriers to Learning No barriers identified  -    Education Provided Patient demonstrated recommended strategies;Family/caregivers demonstrated recommended strategies;Patient requires further education on strategies, risks;Family/caregivers require further education on strategies, risks  -    Assessed Learning needs;Learning motivation;Learning preferences;Learning readiness  -    Learning Motivation Strong  -    Learning Method Explanation;Demonstration;Teach back;Written materials  -    Teaching Response Verbalized understanding;Demonstrated understanding;Reinforcement needed  -    Education Comments Pt given homework for thought organization.   -      User Key  (r) = Recorded By, (t) = Taken By, (c) = Cosigned By    Initials Name Effective Dates    Diana Xie MS CCC-SLP 06/22/15 -                 OP SLP Assessment/Plan - 08/06/18 1000        SLP Assessment    Functional Problems Speech Language- Adult/Cognition  -    Impact on Function: Adult Speech Language/Cognition Difficulty communicating wants, needs, and ideas;Difficulty communicating in a medical emergency;Restrictions in personal and social life;Difficulty sequencing thoughts to  express complex messages;Unable to understand written/spoken language;Difficulty following directions;Difficulty sequencing or problem solving to complete ADLs;Unrealistic view of abilities/deficits;Lack of insight or awareness of deficits, safety issues;Decreased recall of personal information and medical history;Trouble learning or remembering new information;Poor attention to task;Poor judgment;Requires supervision  -HG    Clinical Impression: Speech Language-Adult/Congnition Moderate-Severe:;Cognitive Communication Impairment  -HG    Functional Problems Comment Pt continues to reqiures assistance from her  and has been feeling worse and worse physically- to see MD this week.  -HG    Clinical Impression Comments SLUMS not re-administered due to only 3 tx sessions in.  -HG    Please refer to paper survey for additional self-reported information No  -HG    Please refer to items scanned into chart for additional diagnostic informaiton and handouts as provided by clinician No  -HG    SLP Diagnosis Moderate to Severe Cognitive Impairment.   -HG    Prognosis Good (comment)  -HG    Patient/caregiver participated in establishment of treatment plan and goals No  -HG    Patient would benefit from skilled therapy intervention No  -HG       SLP Plan    Frequency 1-2x/week  -HG    Duration 8 weeks  -HG    Planned CPT's? SLP INDIVIDUAL SPEECH THERAPY: 82379  -    Expected Duration Therapy Session - minutes 45-60 minutes  -HG    Plan Comments Cont with Cog tx.   -HG      User Key  (r) = Recorded By, (t) = Taken By, (c) = Cosigned By    Initials Name Provider Type     Diana Fish MS CCC-SLP Speech and Language Pathologist                 Time Calculation:   SLP Start Time: 1000    Therapy Charges for Today     Code Description Service Date Service Provider Modifiers Qty    94296837090 [facility] MEMORY CURRENT 8/6/2018 Diana Fish MS CCC-SLP GN, CK 1    [id number] [facility] MEMORY PROJECTED 8/6/2018 Polina  Diana DUMONT MS CCC-SLP GN, CJ 1    [id number] [facility] TREATMENT SPEECH 4 8/6/2018 Diana Fish MS CCC-SLP GN 1          SLP G-Codes  SLP NOMS Used?: Yes  Functional Limitations: Memory  Memory Current Status (): At least 40 percent but less than 60 percent impaired, limited or restricted  Memory Goal Status (): At least 20 percent but less than 40 percent impaired, limited or restricted        Diana Fish MS CCC-SLP  8/6/2018

## 2025-01-06 ENCOUNTER — TRANSCRIBE ORDERS (OUTPATIENT)
Dept: ADMINISTRATIVE | Facility: HOSPITAL | Age: 89
End: 2025-01-06
Payer: MEDICARE

## 2025-01-06 DIAGNOSIS — Q23.3 MR (CONGENITAL MITRAL REGURGITATION): Primary | ICD-10-CM
